# Patient Record
Sex: FEMALE | Race: BLACK OR AFRICAN AMERICAN | NOT HISPANIC OR LATINO | URBAN - METROPOLITAN AREA
[De-identification: names, ages, dates, MRNs, and addresses within clinical notes are randomized per-mention and may not be internally consistent; named-entity substitution may affect disease eponyms.]

---

## 2019-05-01 ENCOUNTER — WALK IN (OUTPATIENT)
Dept: URGENT CARE | Age: 34
End: 2019-05-01

## 2019-05-01 VITALS
HEART RATE: 76 BPM | SYSTOLIC BLOOD PRESSURE: 120 MMHG | RESPIRATION RATE: 20 BRPM | DIASTOLIC BLOOD PRESSURE: 84 MMHG | HEIGHT: 67 IN | TEMPERATURE: 98.2 F | WEIGHT: 230 LBS | BODY MASS INDEX: 36.1 KG/M2

## 2019-05-01 DIAGNOSIS — J30.9 ALLERGIC RHINITIS, UNSPECIFIED SEASONALITY, UNSPECIFIED TRIGGER: ICD-10-CM

## 2019-05-01 DIAGNOSIS — J00 ACUTE NASOPHARYNGITIS: Primary | ICD-10-CM

## 2019-05-01 DIAGNOSIS — L21.9 SEBORRHEIC DERMATITIS: ICD-10-CM

## 2019-05-01 LAB
INTERNAL PROCEDURAL CONTROLS ACCEPTABLE: YES
S PYO AG THROAT QL IA.RAPID: NEGATIVE

## 2019-05-01 PROCEDURE — 99203 OFFICE O/P NEW LOW 30 MIN: CPT | Performed by: NURSE PRACTITIONER

## 2019-05-01 PROCEDURE — 87880 STREP A ASSAY W/OPTIC: CPT | Performed by: NURSE PRACTITIONER

## 2019-05-01 SDOH — HEALTH STABILITY: PHYSICAL HEALTH: ON AVERAGE, HOW MANY DAYS PER WEEK DO YOU ENGAGE IN MODERATE TO STRENUOUS EXERCISE (LIKE A BRISK WALK)?: 0 DAYS

## 2019-05-01 ASSESSMENT — ENCOUNTER SYMPTOMS
RESPIRATORY NEGATIVE: 1
CONSTITUTIONAL NEGATIVE: 1
ALLERGIC/IMMUNOLOGIC COMMENTS: SEASONAL ALLERGIES
GASTROINTESTINAL NEGATIVE: 1
NEUROLOGICAL NEGATIVE: 1
EYES NEGATIVE: 1

## 2019-05-24 NOTE — Clinical Note
Pt presents with abnormal platelet count.  Pt notes she has been more tired than usual.     Thank you for allowing me to care for your patient! She is doing well after surgery. I will also follow her for high risk and will take care of ordering her breast imaging. Thanks, Renetta Rangel

## 2019-05-28 ENCOUNTER — HOSPITAL ENCOUNTER (OUTPATIENT)
Age: 34
Discharge: HOME | End: 2019-05-28
Attending: EMERGENCY MEDICINE
Payer: COMMERCIAL

## 2019-05-28 ENCOUNTER — APPOINTMENT (OUTPATIENT)
Dept: CT IMAGING | Age: 34
End: 2019-05-28
Attending: EMERGENCY MEDICINE
Payer: COMMERCIAL

## 2019-05-28 VITALS
DIASTOLIC BLOOD PRESSURE: 69 MMHG | SYSTOLIC BLOOD PRESSURE: 121 MMHG | WEIGHT: 230 LBS | HEART RATE: 88 BPM | TEMPERATURE: 99 F | RESPIRATION RATE: 18 BRPM | HEIGHT: 67 IN | OXYGEN SATURATION: 98 % | BODY MASS INDEX: 36.1 KG/M2

## 2019-05-28 DIAGNOSIS — R10.9 ABDOMINAL PAIN OF UNKNOWN ETIOLOGY: Primary | ICD-10-CM

## 2019-05-28 PROCEDURE — 99204 OFFICE O/P NEW MOD 45 MIN: CPT

## 2019-05-28 PROCEDURE — 74176 CT ABD & PELVIS W/O CONTRAST: CPT | Performed by: EMERGENCY MEDICINE

## 2019-05-28 PROCEDURE — 81025 URINE PREGNANCY TEST: CPT | Performed by: EMERGENCY MEDICINE

## 2019-05-28 PROCEDURE — 81002 URINALYSIS NONAUTO W/O SCOPE: CPT | Performed by: EMERGENCY MEDICINE

## 2019-05-28 PROCEDURE — 36415 COLL VENOUS BLD VENIPUNCTURE: CPT

## 2019-05-28 PROCEDURE — 85025 COMPLETE CBC W/AUTO DIFF WBC: CPT | Performed by: EMERGENCY MEDICINE

## 2019-05-29 NOTE — ED PROVIDER NOTES
Patient Seen in: [facility]    History   Patient presents with:  Abdomen/Flank Pain (GI/)    Stated Complaint: right side pain burning to back     HPI    68-year-old [place] American female who presents to the immediate care t she is afebrile    Head is normocephalic atraumatic conjunctiva is clear. Sclerae anicteric. Neck is supple. Lungs are clear to auscultation bilaterally.   Heart is regular rate and rhythm without murmur gallop or rub    Abdomen is soft nondistended no quadrant abdominal pain for over a month. Saw her OB/GYN today and was told to get an evaluation for possible appendicitis. Patient's work appears unremarkable. Her CT is negative urinalysis is negative pregnancy test is negative.   CBC is normal.  I thi

## 2019-05-29 NOTE — ED INITIAL ASSESSMENT (HPI)
Pt here c/o rt lower pelvic pain. States pain has been present for last month but getting worse. Has had slight nausea, diarrhea over last couple weeks. Denies vomiting.    Pt saw GYN doctor today for same complaint and was told it might be her appendi

## 2019-10-21 ENCOUNTER — WALK IN (OUTPATIENT)
Dept: URGENT CARE | Age: 34
End: 2019-10-21

## 2019-10-21 VITALS
WEIGHT: 230 LBS | TEMPERATURE: 98.2 F | BODY MASS INDEX: 36.1 KG/M2 | HEART RATE: 78 BPM | DIASTOLIC BLOOD PRESSURE: 76 MMHG | HEIGHT: 67 IN | SYSTOLIC BLOOD PRESSURE: 128 MMHG | RESPIRATION RATE: 15 BRPM

## 2019-10-21 DIAGNOSIS — J02.9 SORE THROAT: Primary | ICD-10-CM

## 2019-10-21 LAB
INTERNAL PROCEDURAL CONTROLS ACCEPTABLE: YES
S PYO AG THROAT QL IA.RAPID: NEGATIVE

## 2019-10-21 PROCEDURE — 87880 STREP A ASSAY W/OPTIC: CPT | Performed by: NURSE PRACTITIONER

## 2019-10-21 PROCEDURE — 87081 CULTURE SCREEN ONLY: CPT | Performed by: NURSE PRACTITIONER

## 2019-10-21 PROCEDURE — 99213 OFFICE O/P EST LOW 20 MIN: CPT | Performed by: NURSE PRACTITIONER

## 2019-10-21 ASSESSMENT — ENCOUNTER SYMPTOMS
STRIDOR: 0
FATIGUE: 0
SHORTNESS OF BREATH: 0
HEADACHES: 0
WEAKNESS: 0
FEVER: 0
CHILLS: 0
SINUS PAIN: 0
TROUBLE SWALLOWING: 0
CHEST TIGHTNESS: 0
SINUS PRESSURE: 0
DIAPHORESIS: 0
ADENOPATHY: 0
RHINORRHEA: 1
COUGH: 0
ACTIVITY CHANGE: 0
DIZZINESS: 0
WHEEZING: 0

## 2019-10-23 LAB
REPORT STATUS (RPT): NORMAL
S PYO SPEC QL CULT: NORMAL
SPECIMEN SOURCE: NORMAL

## 2019-10-24 ENCOUNTER — TELEPHONE (OUTPATIENT)
Dept: URGENT CARE | Age: 34
End: 2019-10-24

## 2019-10-28 ENCOUNTER — WALK IN (OUTPATIENT)
Dept: URGENT CARE | Age: 34
End: 2019-10-28

## 2019-10-28 VITALS
RESPIRATION RATE: 16 BRPM | OXYGEN SATURATION: 96 % | SYSTOLIC BLOOD PRESSURE: 120 MMHG | TEMPERATURE: 98.5 F | HEART RATE: 89 BPM | DIASTOLIC BLOOD PRESSURE: 80 MMHG

## 2019-10-28 DIAGNOSIS — J02.0 STREP PHARYNGITIS: ICD-10-CM

## 2019-10-28 DIAGNOSIS — J06.9 VIRAL UPPER RESPIRATORY ILLNESS: Primary | ICD-10-CM

## 2019-10-28 LAB
INTERNAL PROCEDURAL CONTROLS ACCEPTABLE: YES
S PYO AG THROAT QL IA.RAPID: POSITIVE

## 2019-10-28 PROCEDURE — 99213 OFFICE O/P EST LOW 20 MIN: CPT | Performed by: NURSE PRACTITIONER

## 2019-10-28 PROCEDURE — 87880 STREP A ASSAY W/OPTIC: CPT | Performed by: NURSE PRACTITIONER

## 2019-10-28 RX ORDER — FLUTICASONE PROPIONATE 50 MCG
2 SPRAY, SUSPENSION (ML) NASAL DAILY
Qty: 16 G | Refills: 12 | Status: SHIPPED | OUTPATIENT
Start: 2019-10-28 | End: 2021-05-19 | Stop reason: ALTCHOICE

## 2019-10-28 RX ORDER — AMOXICILLIN 500 MG/1
500 CAPSULE ORAL 2 TIMES DAILY
Qty: 20 CAPSULE | Refills: 0 | Status: SHIPPED | OUTPATIENT
Start: 2019-10-28 | End: 2019-11-07

## 2019-10-28 ASSESSMENT — ENCOUNTER SYMPTOMS
SHORTNESS OF BREATH: 0
CHEST TIGHTNESS: 0
WHEEZING: 0
FATIGUE: 0
RESPIRATORY NEGATIVE: 1
ABDOMINAL PAIN: 0
DIZZINESS: 0
VOMITING: 0
SORE THROAT: 1
NAUSEA: 0
HEADACHES: 0
GASTROINTESTINAL NEGATIVE: 1
ALLERGIC/IMMUNOLOGIC NEGATIVE: 1
PSYCHIATRIC NEGATIVE: 1
APPETITE CHANGE: 0
TROUBLE SWALLOWING: 0
ENDOCRINE NEGATIVE: 1
COUGH: 0
EYES NEGATIVE: 1
SWOLLEN GLANDS: 0
HEMATOLOGIC/LYMPHATIC NEGATIVE: 1
SINUS PRESSURE: 0
CONSTITUTIONAL NEGATIVE: 1
SINUS PAIN: 0
DIARRHEA: 0
LIGHT-HEADEDNESS: 0
HOARSE VOICE: 0
NEUROLOGICAL NEGATIVE: 1
FEVER: 0
RHINORRHEA: 1
CHILLS: 0

## 2020-07-23 ENCOUNTER — OFFICE VISIT (OUTPATIENT)
Dept: FAMILY MEDICINE CLINIC | Facility: CLINIC | Age: 35
End: 2020-07-23
Payer: COMMERCIAL

## 2020-07-23 ENCOUNTER — LAB ENCOUNTER (OUTPATIENT)
Dept: LAB | Age: 35
End: 2020-07-23
Attending: FAMILY MEDICINE
Payer: COMMERCIAL

## 2020-07-23 VITALS
HEIGHT: 66 IN | RESPIRATION RATE: 16 BRPM | HEART RATE: 88 BPM | TEMPERATURE: 98 F | WEIGHT: 243 LBS | BODY MASS INDEX: 39.05 KG/M2 | OXYGEN SATURATION: 99 % | SYSTOLIC BLOOD PRESSURE: 118 MMHG | DIASTOLIC BLOOD PRESSURE: 76 MMHG

## 2020-07-23 DIAGNOSIS — E66.09 CLASS 2 OBESITY DUE TO EXCESS CALORIES WITHOUT SERIOUS COMORBIDITY WITH BODY MASS INDEX (BMI) OF 39.0 TO 39.9 IN ADULT: ICD-10-CM

## 2020-07-23 DIAGNOSIS — Z12.4 CERVICAL CANCER SCREENING: ICD-10-CM

## 2020-07-23 DIAGNOSIS — Z13.31 DEPRESSION SCREENING: ICD-10-CM

## 2020-07-23 DIAGNOSIS — Z80.3 FAMILY HISTORY OF BREAST CANCER IN FEMALE: ICD-10-CM

## 2020-07-23 DIAGNOSIS — Z01.419 WELL WOMAN EXAM WITH ROUTINE GYNECOLOGICAL EXAM: ICD-10-CM

## 2020-07-23 DIAGNOSIS — Z76.89 ENCOUNTER TO ESTABLISH CARE: ICD-10-CM

## 2020-07-23 DIAGNOSIS — Z71.82 EXERCISE COUNSELING: ICD-10-CM

## 2020-07-23 DIAGNOSIS — Z71.3 WEIGHT LOSS COUNSELING, ENCOUNTER FOR: ICD-10-CM

## 2020-07-23 DIAGNOSIS — Z00.00 LABORATORY TESTS ORDERED AS PART OF A COMPLETE PHYSICAL EXAM (CPE): ICD-10-CM

## 2020-07-23 DIAGNOSIS — Z12.4 PAP SMEAR FOR CERVICAL CANCER SCREENING: ICD-10-CM

## 2020-07-23 DIAGNOSIS — Z01.419 WELL WOMAN EXAM WITH ROUTINE GYNECOLOGICAL EXAM: Primary | ICD-10-CM

## 2020-07-23 DIAGNOSIS — N92.6 MISSED MENSES: ICD-10-CM

## 2020-07-23 PROBLEM — N80.9 ENDOMETRIOSIS: Status: ACTIVE | Noted: 2020-07-23

## 2020-07-23 LAB
ALBUMIN SERPL-MCNC: 3.6 G/DL (ref 3.4–5)
ALBUMIN/GLOB SERPL: 1.1 {RATIO} (ref 1–2)
ALP LIVER SERPL-CCNC: 27 U/L (ref 37–98)
ALT SERPL-CCNC: 27 U/L (ref 13–56)
ANION GAP SERPL CALC-SCNC: 3 MMOL/L (ref 0–18)
AST SERPL-CCNC: 15 U/L (ref 15–37)
BASOPHILS # BLD AUTO: 0.02 X10(3) UL (ref 0–0.2)
BASOPHILS NFR BLD AUTO: 0.4 %
BILIRUB SERPL-MCNC: 0.5 MG/DL (ref 0.1–2)
BUN BLD-MCNC: 9 MG/DL (ref 7–18)
BUN/CREAT SERPL: 12.9 (ref 10–20)
CALCIUM BLD-MCNC: 8.8 MG/DL (ref 8.5–10.1)
CHLORIDE SERPL-SCNC: 108 MMOL/L (ref 98–112)
CHOLEST SMN-MCNC: 90 MG/DL (ref ?–200)
CO2 SERPL-SCNC: 26 MMOL/L (ref 21–32)
CREAT BLD-MCNC: 0.7 MG/DL (ref 0.55–1.02)
DEPRECATED RDW RBC AUTO: 43.7 FL (ref 35.1–46.3)
EOSINOPHIL # BLD AUTO: 0.09 X10(3) UL (ref 0–0.7)
EOSINOPHIL NFR BLD AUTO: 1.9 %
ERYTHROCYTE [DISTWIDTH] IN BLOOD BY AUTOMATED COUNT: 13.3 % (ref 11–15)
EST. AVERAGE GLUCOSE BLD GHB EST-MCNC: 120 MG/DL (ref 68–126)
GLOBULIN PLAS-MCNC: 3.4 G/DL (ref 2.8–4.4)
GLUCOSE BLD-MCNC: 97 MG/DL (ref 70–99)
HBA1C MFR BLD HPLC: 5.8 % (ref ?–5.7)
HCT VFR BLD AUTO: 36.5 % (ref 35–48)
HDLC SERPL-MCNC: 34 MG/DL (ref 40–59)
HGB BLD-MCNC: 11.8 G/DL (ref 12–16)
IMM GRANULOCYTES # BLD AUTO: 0.01 X10(3) UL (ref 0–1)
IMM GRANULOCYTES NFR BLD: 0.2 %
LDLC SERPL CALC-MCNC: 37 MG/DL (ref ?–100)
LYMPHOCYTES # BLD AUTO: 1.76 X10(3) UL (ref 1–4)
LYMPHOCYTES NFR BLD AUTO: 36.4 %
M PROTEIN MFR SERPL ELPH: 7 G/DL (ref 6.4–8.2)
MCH RBC QN AUTO: 29 PG (ref 26–34)
MCHC RBC AUTO-ENTMCNC: 32.3 G/DL (ref 31–37)
MCV RBC AUTO: 89.7 FL (ref 80–100)
MONOCYTES # BLD AUTO: 0.38 X10(3) UL (ref 0.1–1)
MONOCYTES NFR BLD AUTO: 7.9 %
NEUTROPHILS # BLD AUTO: 2.57 X10 (3) UL (ref 1.5–7.7)
NEUTROPHILS # BLD AUTO: 2.57 X10(3) UL (ref 1.5–7.7)
NEUTROPHILS NFR BLD AUTO: 53.2 %
NONHDLC SERPL-MCNC: 56 MG/DL (ref ?–130)
OSMOLALITY SERPL CALC.SUM OF ELEC: 283 MOSM/KG (ref 275–295)
PATIENT FASTING Y/N/NP: NO
PATIENT FASTING Y/N/NP: NO
PLATELET # BLD AUTO: 175 10(3)UL (ref 150–450)
POCT URINE PREGNANCY: NEGATIVE
POTASSIUM SERPL-SCNC: 3.8 MMOL/L (ref 3.5–5.1)
PROCEDURE CONTROL: YES
RBC # BLD AUTO: 4.07 X10(6)UL (ref 3.8–5.3)
SODIUM SERPL-SCNC: 137 MMOL/L (ref 136–145)
T4 FREE SERPL-MCNC: 1.1 NG/DL (ref 0.8–1.7)
TRIGL SERPL-MCNC: 97 MG/DL (ref 30–149)
TSI SER-ACNC: 1.25 MIU/ML (ref 0.36–3.74)
VLDLC SERPL CALC-MCNC: 19 MG/DL (ref 0–30)
WBC # BLD AUTO: 4.8 X10(3) UL (ref 4–11)

## 2020-07-23 PROCEDURE — 3078F DIAST BP <80 MM HG: CPT | Performed by: FAMILY MEDICINE

## 2020-07-23 PROCEDURE — 88175 CYTOPATH C/V AUTO FLUID REDO: CPT | Performed by: FAMILY MEDICINE

## 2020-07-23 PROCEDURE — 84439 ASSAY OF FREE THYROXINE: CPT

## 2020-07-23 PROCEDURE — 99385 PREV VISIT NEW AGE 18-39: CPT | Performed by: FAMILY MEDICINE

## 2020-07-23 PROCEDURE — 87624 HPV HI-RISK TYP POOLED RSLT: CPT | Performed by: FAMILY MEDICINE

## 2020-07-23 PROCEDURE — 80061 LIPID PANEL: CPT

## 2020-07-23 PROCEDURE — 80053 COMPREHEN METABOLIC PANEL: CPT

## 2020-07-23 PROCEDURE — 85025 COMPLETE CBC W/AUTO DIFF WBC: CPT

## 2020-07-23 PROCEDURE — 84443 ASSAY THYROID STIM HORMONE: CPT

## 2020-07-23 PROCEDURE — 87591 N.GONORRHOEAE DNA AMP PROB: CPT | Performed by: FAMILY MEDICINE

## 2020-07-23 PROCEDURE — 83036 HEMOGLOBIN GLYCOSYLATED A1C: CPT

## 2020-07-23 PROCEDURE — 3074F SYST BP LT 130 MM HG: CPT | Performed by: FAMILY MEDICINE

## 2020-07-23 PROCEDURE — 3008F BODY MASS INDEX DOCD: CPT | Performed by: FAMILY MEDICINE

## 2020-07-23 PROCEDURE — 87491 CHLMYD TRACH DNA AMP PROBE: CPT | Performed by: FAMILY MEDICINE

## 2020-07-23 PROCEDURE — 36415 COLL VENOUS BLD VENIPUNCTURE: CPT

## 2020-07-23 NOTE — PROGRESS NOTES
HPI:   Lexa Mccullough is a 38year old female that presents for well woman exam.   Patient presents with:  Physical: Routine annual well womens exam. She is a new patient with complaints of lumps in her cervix. Labs due and pap today. - she has concerns th Estimated body mass index is 39.22 kg/m² as calculated from the following:    Height as of this encounter: 66\". Weight as of this encounter: 243 lb (110.2 kg). Vital signs reviewed. Appears stated age, well groomed.   Physical Exam:  GEN:  Patient is a PAP SMEAR B  - THINPREP PAP SMEAR ONLY    3. Laboratory tests ordered as part of a complete physical exam (CPE)  - CBC WITH DIFFERENTIAL WITH PLATELET; Future  - COMP METABOLIC PANEL (14); Future  - LIPID PANEL; Future    4.  Depression screening  PHQ-2 Dep adult  - TSH+FREE T4; Future  - HEMOGLOBIN A1C; Future    11. Missed menses  - POCT PREGNANCY, URINE  Negative test.     Risks, benefits, and alternatives of current treatment plan discussed in detail. Red flags discussed to RTC or ED .  Questions and joce

## 2020-07-23 NOTE — PATIENT INSTRUCTIONS
Prevention Guidelines, Women Ages 25 to 44  Screening tests and vaccines are an important part of managing your health. A screening test is done to find possible disorders or diseases in people who don't have any symptoms.  The goal is to find a disease e Type 2 diabetes, prediabetes All women diagnosed with gestational diabetes Lifelong testing every 3 years   Type 2 diabetes All women with prediabetes Every year   Gonorrhea Sexually active women at increased risk for infection At routine exams   Hepatitis Measles, mumps, rubella (MMR) All women in this age group who have no record of these infections or vaccines 1 or 2 doses   Meningococcal Women at increased risk for infection should talk with their healthcare provider 1 or more doses   Pneumococcal conjug © 9572-1100 [employer] [address] [facility], [facility] [place]. All rights reserved. This information is not intended as a substitute for professional medical care. Always follow your healthcare professional's instructions. seated/independent

## 2020-07-24 LAB
C TRACH DNA SPEC QL NAA+PROBE: NEGATIVE
HPV I/H RISK 1 DNA SPEC QL NAA+PROBE: NEGATIVE
N GONORRHOEA DNA SPEC QL NAA+PROBE: NEGATIVE

## 2020-09-21 ENCOUNTER — OFFICE VISIT (OUTPATIENT)
Dept: FAMILY MEDICINE CLINIC | Facility: CLINIC | Age: 35
End: 2020-09-21
Payer: COMMERCIAL

## 2020-09-21 VITALS
DIASTOLIC BLOOD PRESSURE: 74 MMHG | HEART RATE: 82 BPM | TEMPERATURE: 98 F | WEIGHT: 237 LBS | RESPIRATION RATE: 16 BRPM | OXYGEN SATURATION: 98 % | HEIGHT: 66 IN | SYSTOLIC BLOOD PRESSURE: 118 MMHG | BODY MASS INDEX: 38.09 KG/M2

## 2020-09-21 DIAGNOSIS — Z20.822 ENCOUNTER FOR LABORATORY TESTING FOR COVID-19 VIRUS: ICD-10-CM

## 2020-09-21 DIAGNOSIS — J02.9 ACUTE PHARYNGITIS, UNSPECIFIED ETIOLOGY: Primary | ICD-10-CM

## 2020-09-21 DIAGNOSIS — R52 BODY ACHES: ICD-10-CM

## 2020-09-21 PROCEDURE — 99213 OFFICE O/P EST LOW 20 MIN: CPT | Performed by: FAMILY MEDICINE

## 2020-09-21 PROCEDURE — 3008F BODY MASS INDEX DOCD: CPT | Performed by: FAMILY MEDICINE

## 2020-09-21 PROCEDURE — 3078F DIAST BP <80 MM HG: CPT | Performed by: FAMILY MEDICINE

## 2020-09-21 PROCEDURE — 3074F SYST BP LT 130 MM HG: CPT | Performed by: FAMILY MEDICINE

## 2020-09-21 RX ORDER — AZITHROMYCIN 250 MG/1
TABLET, FILM COATED ORAL
Qty: 6 TABLET | Refills: 0 | Status: SHIPPED | OUTPATIENT
Start: 2020-09-21 | End: 2020-09-26

## 2020-09-21 NOTE — PROGRESS NOTES
VIRTUAL/TELEPHONE ENCOUNTER    Subjective    This visit is conducted using live telephone encounter with patient due to [facility] emergency. Chief Complaint:  Patient presents with:  Cough: itchy skin and her neighbor tested positive for strep.  she general with neck movement no  Pain with opening mouth no  Dysphagia no    Therapies tired:  Acetaminophen    COVID-19 QUESTIONS - quick screening.    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 TE done instead of ov due to COVID-19 emergency.      Duration of the service: 12 minutes were spent with the patient     Summary of topics discussed:  URI, COVID risks/testing indica

## 2020-09-21 NOTE — PATIENT INSTRUCTIONS
Coronavirus Disease 2019 (COVID-19): Overview  Coronavirus disease 2019 (COVID-19) is a respiratory illness. It's caused by a new (novel) coronavirus. There are many types of coronavirus. Coronaviruses are a very common cause of colds and bronchitis.  The You can check your symptoms with the CDC’s Coronavirus Self-. What are possible complications from [id number]? In many cases, this virus can cause infection (pneumonia) in both lungs. In some cases, this can cause death.  Certain people are at highe Your healthcare provider will ask about your symptoms. He or she will ask where you live, and about your recent travel, and any contact with sick people.  If your healthcare provider thinks you may have COVID-19, he or she will consider whether to test you The most proven treatments right now are those to help your body while it fights the virus. This is known as supportive care. Supportive care may include:   · Getting rest.  This helps your body fight the illness. · Staying hydrated.   Drinking liquids is People who have had COVID-19 and are fully recovered may be asked by their healthcare team to consider donating plasma. This is called COVID-19 convalescent plasma donation.  Plasma from people fully recovered from COVID-19 may contain antibodies to help fi

## 2020-09-22 ENCOUNTER — APPOINTMENT (OUTPATIENT)
Dept: LAB | Age: 35
End: 2020-09-22
Attending: FAMILY MEDICINE
Payer: COMMERCIAL

## 2020-09-22 DIAGNOSIS — R52 BODY ACHES: ICD-10-CM

## 2020-09-22 DIAGNOSIS — Z20.822 ENCOUNTER FOR LABORATORY TESTING FOR COVID-19 VIRUS: ICD-10-CM

## 2020-09-22 DIAGNOSIS — J02.9 ACUTE PHARYNGITIS, UNSPECIFIED ETIOLOGY: ICD-10-CM

## 2020-09-24 LAB — SARS-COV-2 RNA RESP QL NAA+PROBE: NOT DETECTED

## 2020-09-28 ENCOUNTER — E-VISIT (OUTPATIENT)
Dept: TELEHEALTH | Age: 35
End: 2020-09-28

## 2020-09-28 DIAGNOSIS — J40 BRONCHITIS: Primary | ICD-10-CM

## 2020-09-28 PROCEDURE — 99422 OL DIG E/M SVC 11-20 MIN: CPT | Performed by: NURSE PRACTITIONER

## 2020-09-28 RX ORDER — BENZONATATE 200 MG/1
200 CAPSULE ORAL 3 TIMES DAILY PRN
Qty: 30 CAPSULE | Refills: 0 | Status: SHIPPED | OUTPATIENT
Start: 2020-09-28 | End: 2020-12-03 | Stop reason: ALTCHOICE

## 2020-09-28 RX ORDER — PREDNISONE 20 MG/1
TABLET ORAL
Qty: 12 TABLET | Refills: 0 | Status: SHIPPED | OUTPATIENT
Start: 2020-09-28 | End: 2020-12-03 | Stop reason: ALTCHOICE

## 2020-09-28 RX ORDER — ALBUTEROL SULFATE 90 UG/1
2 AEROSOL, METERED RESPIRATORY (INHALATION) EVERY 6 HOURS PRN
Qty: 1 INHALER | Refills: 0 | Status: SHIPPED | OUTPATIENT
Start: 2020-09-28 | End: 2021-09-23

## 2020-09-28 NOTE — PROGRESS NOTES
Adam Chang is a 38year old female. HPI:   See answers to questions above.      Current Outpatient Medications   Medication Sig Dispense Refill   • Albuterol Sulfate  (90 Base) MCG/ACT Inhalation Aero Soln Inhale 2 puffs into the lungs every Patient advised to follow up with PCP if no improvement or worsening of symptoms  Refer to MyChart message for specific patient instructions        Duration of  the service:  12 minutes

## 2021-02-19 ENCOUNTER — PATIENT MESSAGE (OUTPATIENT)
Dept: FAMILY MEDICINE CLINIC | Facility: CLINIC | Age: 36
End: 2021-02-19

## 2021-02-19 DIAGNOSIS — Z12.39 ENCOUNTER FOR SCREENING BREAST EXAMINATION: ICD-10-CM

## 2021-02-19 DIAGNOSIS — Z12.31 BREAST CANCER SCREENING BY MAMMOGRAM: Primary | ICD-10-CM

## 2021-02-20 NOTE — TELEPHONE ENCOUNTER
From: Faustino Coyne  To: Florina Alfred DO  Sent: 2021 10:31 PM CST  Subject: Non-Urgent Medical Question    Hello     I need to schedule my annual mammogram. You had a referral for it here, but I think it .     Thanks,    Carolin Rosa

## 2021-05-19 ENCOUNTER — HOSPITAL ENCOUNTER (OUTPATIENT)
Dept: MAMMOGRAPHY | Age: 36
Discharge: HOME | End: 2021-05-19
Attending: FAMILY MEDICINE
Payer: COMMERCIAL

## 2021-05-19 ENCOUNTER — WALK IN (OUTPATIENT)
Dept: URGENT CARE | Age: 36
End: 2021-05-19

## 2021-05-19 VITALS
HEART RATE: 76 BPM | TEMPERATURE: 97.5 F | BODY MASS INDEX: 38.45 KG/M2 | RESPIRATION RATE: 20 BRPM | HEIGHT: 67 IN | DIASTOLIC BLOOD PRESSURE: 84 MMHG | WEIGHT: 245 LBS | SYSTOLIC BLOOD PRESSURE: 116 MMHG

## 2021-05-19 DIAGNOSIS — Z12.31 BREAST CANCER SCREENING BY MAMMOGRAM: ICD-10-CM

## 2021-05-19 DIAGNOSIS — Z12.39 ENCOUNTER FOR SCREENING BREAST EXAMINATION: ICD-10-CM

## 2021-05-19 DIAGNOSIS — J30.9 ALLERGIC RHINITIS, UNSPECIFIED SEASONALITY, UNSPECIFIED TRIGGER: ICD-10-CM

## 2021-05-19 DIAGNOSIS — J06.9 VIRAL UPPER RESPIRATORY TRACT INFECTION: Primary | ICD-10-CM

## 2021-05-19 PROBLEM — N80.9 ENDOMETRIOSIS: Status: ACTIVE | Noted: 2020-07-23

## 2021-05-19 LAB
INTERNAL PROCEDURAL CONTROLS ACCEPTABLE: YES
S PYO AG THROAT QL IA.RAPID: NEGATIVE
SARS-COV+SARS-COV-2 AG RESP QL IA.RAPID: NOT DETECTED

## 2021-05-19 PROCEDURE — 77067 SCR MAMMO BI INCL CAD: CPT | Performed by: FAMILY MEDICINE

## 2021-05-19 PROCEDURE — 99213 OFFICE O/P EST LOW 20 MIN: CPT | Performed by: NURSE PRACTITIONER

## 2021-05-19 PROCEDURE — 87426 SARSCOV CORONAVIRUS AG IA: CPT | Performed by: NURSE PRACTITIONER

## 2021-05-19 PROCEDURE — 77063 BREAST TOMOSYNTHESIS BI: CPT | Performed by: FAMILY MEDICINE

## 2021-05-19 PROCEDURE — 87880 STREP A ASSAY W/OPTIC: CPT | Performed by: NURSE PRACTITIONER

## 2021-05-19 RX ORDER — FLUTICASONE PROPIONATE 50 MCG
2 SPRAY, SUSPENSION (ML) NASAL DAILY
Qty: 16 G | Refills: 0 | Status: SHIPPED | OUTPATIENT
Start: 2021-05-19

## 2021-05-19 ASSESSMENT — ENCOUNTER SYMPTOMS
HEADACHES: 0
EYES NEGATIVE: 1
FATIGUE: 0
COUGH: 0
RHINORRHEA: 1
WHEEZING: 0
DIZZINESS: 0
ACTIVITY CHANGE: 0
LIGHT-HEADEDNESS: 0
FEVER: 0
GASTROINTESTINAL NEGATIVE: 1
SORE THROAT: 1
SHORTNESS OF BREATH: 0

## 2021-05-21 ENCOUNTER — OFFICE VISIT (OUTPATIENT)
Dept: FAMILY MEDICINE CLINIC | Facility: CLINIC | Age: 36
End: 2021-05-21
Payer: COMMERCIAL

## 2021-05-21 VITALS
OXYGEN SATURATION: 99 % | RESPIRATION RATE: 16 BRPM | HEIGHT: 66 IN | DIASTOLIC BLOOD PRESSURE: 74 MMHG | WEIGHT: 245 LBS | TEMPERATURE: 98 F | HEART RATE: 88 BPM | BODY MASS INDEX: 39.37 KG/M2 | SYSTOLIC BLOOD PRESSURE: 116 MMHG

## 2021-05-21 DIAGNOSIS — E04.9 ENLARGED THYROID: Primary | ICD-10-CM

## 2021-05-21 DIAGNOSIS — E66.09 CLASS 2 OBESITY DUE TO EXCESS CALORIES WITHOUT SERIOUS COMORBIDITY WITH BODY MASS INDEX (BMI) OF 39.0 TO 39.9 IN ADULT: ICD-10-CM

## 2021-05-21 PROCEDURE — 3078F DIAST BP <80 MM HG: CPT | Performed by: FAMILY MEDICINE

## 2021-05-21 PROCEDURE — 3008F BODY MASS INDEX DOCD: CPT | Performed by: FAMILY MEDICINE

## 2021-05-21 PROCEDURE — 99213 OFFICE O/P EST LOW 20 MIN: CPT | Performed by: FAMILY MEDICINE

## 2021-05-21 PROCEDURE — 3074F SYST BP LT 130 MM HG: CPT | Performed by: FAMILY MEDICINE

## 2021-05-21 NOTE — PROGRESS NOTES
Patient presents with:  Thyroid Problem: She went to [facility] and was told her thyroid felt enlarged. she has concerns as to thats why she has been gaining weight      HPI:  75-year-old female patient who is presenting with enlarged thyroid gland.   She was seen Inhale 2 puffs into the lungs every 6 (six) hours as needed for Wheezing. 1 Inhaler 0       Allergies    Shellfish Allergy       NAUSEA AND VOMITING    Health Maintenance  Immunizations: There is no immunization history on file for this patient.       Ph encounter       Imaging & Consults:  OP REFERRAL TO WEIGHT LOSS CLINIC  US THYROID ([id number])      No follow-ups on file. There are no Patient Instructions on file for this visit. All questions were answered and the patient understands the plan.

## 2021-05-25 ENCOUNTER — HOSPITAL ENCOUNTER (OUTPATIENT)
Dept: ULTRASOUND IMAGING | Age: 36
Discharge: HOME | End: 2021-05-25
Attending: FAMILY MEDICINE
Payer: COMMERCIAL

## 2021-05-25 ENCOUNTER — LAB ENCOUNTER (OUTPATIENT)
Dept: LAB | Age: 36
End: 2021-05-25
Attending: FAMILY MEDICINE
Payer: COMMERCIAL

## 2021-05-25 DIAGNOSIS — E04.9 ENLARGED THYROID: ICD-10-CM

## 2021-05-25 PROCEDURE — 84443 ASSAY THYROID STIM HORMONE: CPT

## 2021-05-25 PROCEDURE — 36415 COLL VENOUS BLD VENIPUNCTURE: CPT

## 2021-05-25 PROCEDURE — 76536 US EXAM OF HEAD AND NECK: CPT | Performed by: FAMILY MEDICINE

## 2021-05-25 PROCEDURE — 86800 THYROGLOBULIN ANTIBODY: CPT

## 2021-05-25 PROCEDURE — 86376 MICROSOMAL ANTIBODY EACH: CPT

## 2021-05-25 PROCEDURE — 84439 ASSAY OF FREE THYROXINE: CPT

## 2021-05-27 ENCOUNTER — PATIENT MESSAGE (OUTPATIENT)
Dept: FAMILY MEDICINE CLINIC | Facility: CLINIC | Age: 36
End: 2021-05-27

## 2021-05-27 PROBLEM — E04.1 BENIGN THYROID CYST: Status: ACTIVE | Noted: 2021-05-27

## 2021-05-27 NOTE — TELEPHONE ENCOUNTER
From: Gail Sen  To: Giselle Elliott DO  Sent: 5/27/2021 7:58 AM CDT  Subject: Test Results Question    Hello,    I saw my test results came in yesterday. Just wondering what the results mean and next steps.      Thanks,    Gabriela Araujo

## 2021-06-07 ENCOUNTER — WALK IN (OUTPATIENT)
Dept: URGENT CARE | Age: 36
End: 2021-06-07

## 2021-06-07 VITALS
WEIGHT: 245 LBS | SYSTOLIC BLOOD PRESSURE: 120 MMHG | BODY MASS INDEX: 38.45 KG/M2 | OXYGEN SATURATION: 100 % | DIASTOLIC BLOOD PRESSURE: 82 MMHG | HEART RATE: 80 BPM | HEIGHT: 67 IN | TEMPERATURE: 100.1 F | RESPIRATION RATE: 20 BRPM

## 2021-06-07 DIAGNOSIS — B34.9 VIRAL SYNDROME: Primary | ICD-10-CM

## 2021-06-07 LAB
FLUAV AG UPPER RESP QL IA.RAPID: NEGATIVE
FLUBV AG UPPER RESP QL IA.RAPID: NEGATIVE
INTERNAL PROCEDURAL CONTROLS ACCEPTABLE: YES
S PYO AG THROAT QL IA.RAPID: NEGATIVE
SARS-COV+SARS-COV-2 AG RESP QL IA.RAPID: NOT DETECTED

## 2021-06-07 PROCEDURE — 87804 INFLUENZA ASSAY W/OPTIC: CPT | Performed by: NURSE PRACTITIONER

## 2021-06-07 PROCEDURE — 99213 OFFICE O/P EST LOW 20 MIN: CPT | Performed by: NURSE PRACTITIONER

## 2021-06-07 PROCEDURE — U0003 INFECTIOUS AGENT DETECTION BY NUCLEIC ACID (DNA OR RNA); SEVERE ACUTE RESPIRATORY SYNDROME CORONAVIRUS 2 (SARS-COV-2) (CORONAVIRUS DISEASE [COVID-19]), AMPLIFIED PROBE TECHNIQUE, MAKING USE OF HIGH THROUGHPUT TECHNOLOGIES AS DESCRIBED BY CMS-2020-01-R: HCPCS | Performed by: NURSE PRACTITIONER

## 2021-06-07 PROCEDURE — U0005 INFEC AGEN DETEC AMPLI PROBE: HCPCS | Performed by: NURSE PRACTITIONER

## 2021-06-07 PROCEDURE — 87426 SARSCOV CORONAVIRUS AG IA: CPT | Performed by: NURSE PRACTITIONER

## 2021-06-07 PROCEDURE — 87880 STREP A ASSAY W/OPTIC: CPT | Performed by: NURSE PRACTITIONER

## 2021-06-07 RX ORDER — BENZONATATE 200 MG/1
200 CAPSULE ORAL 3 TIMES DAILY PRN
Qty: 20 CAPSULE | Refills: 0 | Status: SHIPPED | OUTPATIENT
Start: 2021-06-07

## 2021-06-07 ASSESSMENT — ENCOUNTER SYMPTOMS
COUGH: 1
WHEEZING: 0
NEUROLOGICAL NEGATIVE: 1
ACTIVITY CHANGE: 0
CHILLS: 1
RHINORRHEA: 1
FEVER: 0
SHORTNESS OF BREATH: 0
SORE THROAT: 1
GASTROINTESTINAL NEGATIVE: 1
FATIGUE: 1

## 2021-06-08 LAB
SARS-COV-2 RNA RESP QL NAA+PROBE: NOT DETECTED
SERVICE CMNT-IMP: NORMAL
SERVICE CMNT-IMP: NORMAL

## 2021-06-30 ENCOUNTER — OFFICE VISIT (OUTPATIENT)
Dept: INTERNAL MEDICINE CLINIC | Facility: CLINIC | Age: 36
End: 2021-06-30
Payer: COMMERCIAL

## 2021-06-30 VITALS
BODY MASS INDEX: 38.3 KG/M2 | SYSTOLIC BLOOD PRESSURE: 118 MMHG | HEART RATE: 82 BPM | HEIGHT: 67 IN | WEIGHT: 244 LBS | RESPIRATION RATE: 16 BRPM | DIASTOLIC BLOOD PRESSURE: 72 MMHG

## 2021-06-30 DIAGNOSIS — E78.6 LOW HDL (UNDER 40): ICD-10-CM

## 2021-06-30 DIAGNOSIS — Z51.81 THERAPEUTIC DRUG MONITORING: Primary | ICD-10-CM

## 2021-06-30 DIAGNOSIS — R73.03 PREDIABETES: ICD-10-CM

## 2021-06-30 DIAGNOSIS — E66.9 OBESITY WITH BODY MASS INDEX (BMI) OF 35.0 TO 39.9 WITHOUT COMORBIDITY: ICD-10-CM

## 2021-06-30 DIAGNOSIS — H40.059: ICD-10-CM

## 2021-06-30 PROCEDURE — 3008F BODY MASS INDEX DOCD: CPT | Performed by: NURSE PRACTITIONER

## 2021-06-30 PROCEDURE — 93000 ELECTROCARDIOGRAM COMPLETE: CPT | Performed by: NURSE PRACTITIONER

## 2021-06-30 PROCEDURE — 99214 OFFICE O/P EST MOD 30 MIN: CPT | Performed by: NURSE PRACTITIONER

## 2021-06-30 PROCEDURE — 3074F SYST BP LT 130 MM HG: CPT | Performed by: NURSE PRACTITIONER

## 2021-06-30 PROCEDURE — 3078F DIAST BP <80 MM HG: CPT | Performed by: NURSE PRACTITIONER

## 2021-06-30 RX ORDER — PHENTERMINE HYDROCHLORIDE 15 MG/1
15 CAPSULE ORAL EVERY MORNING
Qty: 30 CAPSULE | Refills: 0 | Status: SHIPPED | OUTPATIENT
Start: 2021-06-30 | End: 2021-07-29

## 2021-06-30 NOTE — PROGRESS NOTES
HISTORY OF PRESENT ILLNESS  Patient presents with:  Weight Problem: referred by , [facility] , never had meds beside [id number] inj. open to trying meds     Clau Mcintosh is a 43year old female new to our office today f yes  Tobacco use: none  ETOH use: 2  per/week  Supplements: none  Exercise: 2 days per week- biking 5-10 miles   Stress level: 7/10  Sleep hours and integrity: 5 Hours per night    MEDICAL HISTORY  PMH reviewed:   Cardiac disorders:negative   Depression/an without murmur  GI: +BS, soft, no masses, HSM or tenderness  EXTREMITIES: no cyanosis, no clubbing, no edema  NEURO: Oriented times three, full ROM of bilateral UE/LE  PSYCH: pleasant, cooperative, normal mood and affect    Lab Results   Component Value Da Goal: 12.2  10% Goal: 24.4  Total Weight Loss: 0 lbs  Initial consult: 244  lbs on 6/2021, Down  Lb:  lbs total     PLAN   · Visualized Ekg in office today 6/30/21 shows NSR, rate 74, nl intervals, no acute ST changes, TAb292. Normal EKG.    · Will start me Aim for 3 meals/day  2. Drink lots of water and cut down on soda/juice consumption if soda/juice drinker  3. Focus on protein: (15-30 grams with each meal) ie. greek yogurt, cottage cheese, string cheese, hard boiled eggs  4.  Healthy snacks: peanut butter nut butters  -dried edamame   -fermin seeds soaked in water or almond milk  -soy nuts  -cured meats (monitor for sodium issues)   -hummus with vegetables  -bean dip with vegetables     FRUIT  Low carb fruit options   Raspberries: Half a cup (60 grams) contai

## 2021-06-30 NOTE — PATIENT INSTRUCTIONS
We are here to support you with weight loss, but please remember that you still need your primary care provider for your routine health maintenance. PLAN:  Will start phentermine 15mg   Follow up with me in 1 month  Schedule follow up appointments:  To at nutrition section-- \"nutrients\" and it will break down your macros for the day (ie. Protein, carbs, fibers, sugars and fats). Try to stay within these numbers daily     2.  \"7 minute workout\" to help with exercise/activity which takes 7 minutes of yo

## 2021-07-23 DIAGNOSIS — J30.9 ALLERGIC RHINITIS, UNSPECIFIED SEASONALITY, UNSPECIFIED TRIGGER: ICD-10-CM

## 2021-07-26 RX ORDER — FLUTICASONE PROPIONATE 50 MCG
SPRAY, SUSPENSION (ML) NASAL
Qty: 16 ML | OUTPATIENT
Start: 2021-07-26

## 2021-07-29 DIAGNOSIS — E66.9 OBESITY WITH BODY MASS INDEX (BMI) OF 35.0 TO 39.9 WITHOUT COMORBIDITY: ICD-10-CM

## 2021-07-29 DIAGNOSIS — Z51.81 THERAPEUTIC DRUG MONITORING: ICD-10-CM

## 2021-07-29 RX ORDER — PHENTERMINE HYDROCHLORIDE 15 MG/1
CAPSULE ORAL
Qty: 30 CAPSULE | Refills: 0 | Status: SHIPPED | OUTPATIENT
Start: 2021-07-29 | End: 2021-08-02

## 2021-07-29 NOTE — TELEPHONE ENCOUNTER
Requesting Phentermine 15 mg  LOV: 6/30/21  RTC: one month  Last Relevant Labs: na  Filled: 6/30/21 #30 with 0 refills last [place] 6[place]30/21 [place]    Future Appointments   Date Time Provider Fahad Bennett   8/2/2021  2:20 PM Jeannie Briscoe An

## 2021-08-02 ENCOUNTER — OFFICE VISIT (OUTPATIENT)
Dept: INTERNAL MEDICINE CLINIC | Facility: CLINIC | Age: 36
End: 2021-08-02
Payer: COMMERCIAL

## 2021-08-02 VITALS
SYSTOLIC BLOOD PRESSURE: 112 MMHG | BODY MASS INDEX: 37.67 KG/M2 | OXYGEN SATURATION: 100 % | HEART RATE: 69 BPM | WEIGHT: 240 LBS | DIASTOLIC BLOOD PRESSURE: 80 MMHG | HEIGHT: 67 IN

## 2021-08-02 DIAGNOSIS — E66.9 OBESITY WITH BODY MASS INDEX (BMI) OF 35.0 TO 39.9 WITHOUT COMORBIDITY: ICD-10-CM

## 2021-08-02 DIAGNOSIS — R73.03 PREDIABETES: ICD-10-CM

## 2021-08-02 DIAGNOSIS — Z51.81 THERAPEUTIC DRUG MONITORING: Primary | ICD-10-CM

## 2021-08-02 DIAGNOSIS — E78.6 LOW HDL (UNDER 40): ICD-10-CM

## 2021-08-02 PROCEDURE — 99214 OFFICE O/P EST MOD 30 MIN: CPT | Performed by: NURSE PRACTITIONER

## 2021-08-02 PROCEDURE — 3008F BODY MASS INDEX DOCD: CPT | Performed by: NURSE PRACTITIONER

## 2021-08-02 PROCEDURE — 3074F SYST BP LT 130 MM HG: CPT | Performed by: NURSE PRACTITIONER

## 2021-08-02 PROCEDURE — 3079F DIAST BP 80-89 MM HG: CPT | Performed by: NURSE PRACTITIONER

## 2021-08-02 RX ORDER — PHENTERMINE HYDROCHLORIDE 37.5 MG/1
37.5 TABLET ORAL
Qty: 30 TABLET | Refills: 0 | Status: SHIPPED | OUTPATIENT
Start: 2021-08-15 | End: 2021-09-13

## 2021-08-02 RX ORDER — FLUTICASONE PROPIONATE 50 MCG
SPRAY, SUSPENSION (ML) NASAL
COMMUNITY
Start: 2021-06-05

## 2021-08-02 NOTE — PATIENT INSTRUCTIONS
We are here to support you with weight loss, but please remember that you still need your primary care provider for your routine health maintenance.       PLAN:  Will increase phentermine 37.5mg  Follow up with me in 5 weeks  Schedule follow up appointments Look at nutrition section-- \"nutrients\" and it will break down your macros for the day (ie. Protein, carbs, fibers, sugars and fats). Try to stay within these numbers daily     2.  \"7 minute workout\" to help with exercise/activity which takes 7 minutes

## 2021-08-02 NOTE — PROGRESS NOTES
HISTORY OF PRESENT ILLNESS  Patient presents with:  Weight Check: down 4    Clau Barr is a 43year old female here for follow up with medical weight loss program for the treatment of overweight, obesity, or morbid obesity.      Down 4 lbs (First month pink, sclera non icteric, PERRLA  NECK: supple, no adenopathy  LUNGS: CTA in all fields, breathing non labored  CARDIO: RRR without murmur  GI: +BS, NT/ND, no masses or HSM  EXTREMITIES: no cyanosis, no clubbing, no edema  PSYCH: pleasant, cooperative, nor lbs on 6/2021  Weight Calculations  Initial Weight: 244 lbs  Initial Weight Date: 06/30/21  Today's Weight: 240 lbs  5% Goal: 12.2  10% Goal: 24.4  Total Weight Loss: 4 lbs  Total weight loss: Down 4 lbs total, Net loss 4 lbs  · Will increase medications:

## 2021-08-30 ENCOUNTER — PATIENT MESSAGE (OUTPATIENT)
Dept: FAMILY MEDICINE CLINIC | Facility: CLINIC | Age: 36
End: 2021-08-30

## 2021-08-30 DIAGNOSIS — Z20.822 CONTACT WITH AND (SUSPECTED) EXPOSURE TO COVID-19: Primary | ICD-10-CM

## 2021-08-31 NOTE — TELEPHONE ENCOUNTER
From: Sylvie Kelly  To: Chanda Temple DO  Sent: 8/30/2021 9:35 PM CDT  Subject: Non-Urgent Medical Question    Hello,    Sunday night I drove to [place] and back and attended a wedding with my mother.  She received a call on the ride home informing he

## 2021-09-01 ENCOUNTER — LAB SERVICES (OUTPATIENT)
Dept: URGENT CARE | Age: 36
End: 2021-09-01

## 2021-09-01 DIAGNOSIS — Z20.822 COVID-19 RULED OUT BY LABORATORY TESTING: Primary | ICD-10-CM

## 2021-09-01 PROCEDURE — U0005 INFEC AGEN DETEC AMPLI PROBE: HCPCS | Performed by: NURSE PRACTITIONER

## 2021-09-01 PROCEDURE — U0003 INFECTIOUS AGENT DETECTION BY NUCLEIC ACID (DNA OR RNA); SEVERE ACUTE RESPIRATORY SYNDROME CORONAVIRUS 2 (SARS-COV-2) (CORONAVIRUS DISEASE [COVID-19]), AMPLIFIED PROBE TECHNIQUE, MAKING USE OF HIGH THROUGHPUT TECHNOLOGIES AS DESCRIBED BY CMS-2020-01-R: HCPCS | Performed by: NURSE PRACTITIONER

## 2021-09-02 LAB
SARS-COV-2 RNA RESP QL NAA+PROBE: NOT DETECTED
SERVICE CMNT-IMP: NORMAL
SERVICE CMNT-IMP: NORMAL

## 2021-09-13 ENCOUNTER — OFFICE VISIT (OUTPATIENT)
Dept: INTERNAL MEDICINE CLINIC | Facility: CLINIC | Age: 36
End: 2021-09-13
Payer: COMMERCIAL

## 2021-09-13 VITALS
RESPIRATION RATE: 17 BRPM | HEART RATE: 78 BPM | SYSTOLIC BLOOD PRESSURE: 120 MMHG | BODY MASS INDEX: 35.16 KG/M2 | HEIGHT: 67 IN | WEIGHT: 224 LBS | OXYGEN SATURATION: 99 % | DIASTOLIC BLOOD PRESSURE: 76 MMHG

## 2021-09-13 DIAGNOSIS — R73.03 PREDIABETES: ICD-10-CM

## 2021-09-13 DIAGNOSIS — E66.9 OBESITY WITH BODY MASS INDEX (BMI) OF 35.0 TO 39.9 WITHOUT COMORBIDITY: ICD-10-CM

## 2021-09-13 DIAGNOSIS — E78.6 LOW HDL (UNDER 40): ICD-10-CM

## 2021-09-13 DIAGNOSIS — Z51.81 THERAPEUTIC DRUG MONITORING: Primary | ICD-10-CM

## 2021-09-13 PROCEDURE — 3074F SYST BP LT 130 MM HG: CPT | Performed by: NURSE PRACTITIONER

## 2021-09-13 PROCEDURE — 3078F DIAST BP <80 MM HG: CPT | Performed by: NURSE PRACTITIONER

## 2021-09-13 PROCEDURE — 99214 OFFICE O/P EST MOD 30 MIN: CPT | Performed by: NURSE PRACTITIONER

## 2021-09-13 PROCEDURE — 3008F BODY MASS INDEX DOCD: CPT | Performed by: NURSE PRACTITIONER

## 2021-09-13 RX ORDER — PHENTERMINE HYDROCHLORIDE 37.5 MG/1
37.5 TABLET ORAL
Qty: 30 TABLET | Refills: 1 | Status: SHIPPED | OUTPATIENT
Start: 2021-09-13

## 2021-09-13 RX ORDER — PHENTERMINE HYDROCHLORIDE 37.5 MG/1
37.5 TABLET ORAL
Qty: 30 TABLET | Refills: 0 | Status: CANCELLED | OUTPATIENT
Start: 2021-09-13

## 2021-09-13 NOTE — PROGRESS NOTES
HISTORY OF PRESENT ILLNESS  Patient presents with:  Weight Check: down 16    Clau Oliveira is a 43year old female here for follow up with medical weight loss program for the treatment of overweight, obesity, or morbid obesity.      Down 16 lbs  Compliant pink, sclera non icteric, PERRLA  NECK: supple, no adenopathy  LUNGS: CTA in all fields, breathing non labored  CARDIO: RRR without murmur  GI: +BS, NT/ND, no masses or HSM  EXTREMITIES: no cyanosis, no clubbing, no edema  PSYCH: pleasant, cooperative, nor Calculations  Initial Weight: 244 lbs  Initial Weight Date: 06/30/21  Today's Weight: 224 lbs  5% Goal: 12.2  10% Goal: 24.4  Total Weight Loss: 20 lbs  Total weight loss: Down 16 lbs total, Net loss 20 lbs  · Continue with medications: phentermine 37.5mg

## 2021-09-13 NOTE — PATIENT INSTRUCTIONS
We are here to support you with weight loss, but please remember that you still need your primary care provider for your routine health maintenance.       PLAN:  Will continue with phentermine 37.5mg   Follow up with me in 4-8 weeks  Schedule follow up appo INPUT> Look at nutrition section-- \"nutrients\" and it will break down your macros for the day (ie. Protein, carbs, fibers, sugars and fats). Try to stay within these numbers daily     2.  \"7 minute workout\" to help with exercise/activity which takes 7 m

## 2021-09-23 ENCOUNTER — OFFICE VISIT (OUTPATIENT)
Dept: FAMILY MEDICINE CLINIC | Facility: CLINIC | Age: 36
End: 2021-09-23
Payer: COMMERCIAL

## 2021-09-23 VITALS
BODY MASS INDEX: 35.16 KG/M2 | RESPIRATION RATE: 16 BRPM | DIASTOLIC BLOOD PRESSURE: 70 MMHG | SYSTOLIC BLOOD PRESSURE: 118 MMHG | HEART RATE: 74 BPM | WEIGHT: 224 LBS | TEMPERATURE: 98 F | HEIGHT: 67 IN

## 2021-09-23 DIAGNOSIS — Z80.3 FAMILY HISTORY OF BREAST CANCER: ICD-10-CM

## 2021-09-23 DIAGNOSIS — E04.1 BENIGN THYROID CYST: ICD-10-CM

## 2021-09-23 DIAGNOSIS — E01.0 THYROMEGALY: ICD-10-CM

## 2021-09-23 DIAGNOSIS — N80.9 ENDOMETRIOSIS: ICD-10-CM

## 2021-09-23 DIAGNOSIS — Z00.00 WELL ADULT EXAM: Primary | ICD-10-CM

## 2021-09-23 PROCEDURE — 3074F SYST BP LT 130 MM HG: CPT | Performed by: FAMILY MEDICINE

## 2021-09-23 PROCEDURE — 3078F DIAST BP <80 MM HG: CPT | Performed by: FAMILY MEDICINE

## 2021-09-23 PROCEDURE — 99395 PREV VISIT EST AGE 18-39: CPT | Performed by: FAMILY MEDICINE

## 2021-09-23 PROCEDURE — 3008F BODY MASS INDEX DOCD: CPT | Performed by: FAMILY MEDICINE

## 2021-09-23 NOTE — PROGRESS NOTES
HPI:   Judi Camarena is a 43year old female that presents for wellness exam.   Patient presents with:  Physical: Routine annual physical with fasting labs due    Patient has hx of endometriosis. Had two children naturally.    There is a family hx of robert REVIEW OF SYSTEMS:       Review of Systems   Constitutional: Negative for fever, chills and fatigue. No distress. HENT: Negative for hearing loss, congestion, sore throat, neck pain and dental problem.     Eyes: Negative for pain and visual disturb turgor. HEART:  Regular rate and rhythm, no murmurs, rubs or gallops. LUNGS: Clear to auscultation bilterally, no rales/rhonchi/wheezing. ABDOMEN:  Soft, nondistended, nontender, bowel sounds normal in all 4 quadrants, no masses, no hepatosplenomegaly.

## 2021-09-28 ENCOUNTER — LAB ENCOUNTER (OUTPATIENT)
Dept: LAB | Age: 36
End: 2021-09-28
Attending: FAMILY MEDICINE
Payer: COMMERCIAL

## 2021-09-28 DIAGNOSIS — Z00.00 WELL ADULT EXAM: ICD-10-CM

## 2021-09-28 PROCEDURE — 80061 LIPID PANEL: CPT

## 2021-09-28 PROCEDURE — 84439 ASSAY OF FREE THYROXINE: CPT

## 2021-09-28 PROCEDURE — 36415 COLL VENOUS BLD VENIPUNCTURE: CPT

## 2021-09-28 PROCEDURE — 84443 ASSAY THYROID STIM HORMONE: CPT

## 2021-09-28 PROCEDURE — 80053 COMPREHEN METABOLIC PANEL: CPT

## 2021-09-28 PROCEDURE — 85025 COMPLETE CBC W/AUTO DIFF WBC: CPT

## 2021-09-30 ENCOUNTER — WALK IN (OUTPATIENT)
Dept: URGENT CARE | Age: 36
End: 2021-09-30

## 2021-09-30 VITALS
RESPIRATION RATE: 18 BRPM | HEIGHT: 67 IN | BODY MASS INDEX: 35.31 KG/M2 | WEIGHT: 225 LBS | HEART RATE: 94 BPM | OXYGEN SATURATION: 98 % | TEMPERATURE: 95.4 F | DIASTOLIC BLOOD PRESSURE: 74 MMHG | SYSTOLIC BLOOD PRESSURE: 110 MMHG

## 2021-09-30 DIAGNOSIS — B34.9 VIRAL SYNDROME: ICD-10-CM

## 2021-09-30 DIAGNOSIS — R07.0 THROAT PAIN: ICD-10-CM

## 2021-09-30 DIAGNOSIS — J02.9 ACUTE PHARYNGITIS, UNSPECIFIED ETIOLOGY: Primary | ICD-10-CM

## 2021-09-30 PROCEDURE — 99214 OFFICE O/P EST MOD 30 MIN: CPT | Performed by: NURSE PRACTITIONER

## 2021-09-30 PROCEDURE — U0005 INFEC AGEN DETEC AMPLI PROBE: HCPCS | Performed by: NURSE PRACTITIONER

## 2021-09-30 PROCEDURE — U0003 INFECTIOUS AGENT DETECTION BY NUCLEIC ACID (DNA OR RNA); SEVERE ACUTE RESPIRATORY SYNDROME CORONAVIRUS 2 (SARS-COV-2) (CORONAVIRUS DISEASE [COVID-19]), AMPLIFIED PROBE TECHNIQUE, MAKING USE OF HIGH THROUGHPUT TECHNOLOGIES AS DESCRIBED BY CMS-2020-01-R: HCPCS | Performed by: NURSE PRACTITIONER

## 2021-09-30 RX ORDER — AZITHROMYCIN 250 MG/1
TABLET, FILM COATED ORAL
Qty: 6 TABLET | Refills: 0 | Status: SHIPPED | OUTPATIENT
Start: 2021-09-30

## 2021-09-30 RX ORDER — BENZONATATE 100 MG/1
100 CAPSULE ORAL 3 TIMES DAILY PRN
Qty: 21 CAPSULE | Refills: 0 | Status: SHIPPED | OUTPATIENT
Start: 2021-09-30

## 2021-09-30 ASSESSMENT — ENCOUNTER SYMPTOMS
PSYCHIATRIC NEGATIVE: 1
CHEST TIGHTNESS: 0
NUMBNESS: 0
FEVER: 0
TROUBLE SWALLOWING: 1
SHORTNESS OF BREATH: 0
EYES NEGATIVE: 1
STRIDOR: 0
COUGH: 1
FATIGUE: 0
SORE THROAT: 1
HEADACHES: 1
RHINORRHEA: 1
CHILLS: 0
APPETITE CHANGE: 1

## 2021-09-30 ASSESSMENT — PAIN SCALES - GENERAL: PAINLEVEL: 7

## 2021-10-01 LAB
SARS-COV-2 RNA RESP QL NAA+PROBE: NOT DETECTED
SERVICE CMNT-IMP: NORMAL
SERVICE CMNT-IMP: NORMAL

## 2021-10-05 ENCOUNTER — PATIENT MESSAGE (OUTPATIENT)
Dept: FAMILY MEDICINE CLINIC | Facility: CLINIC | Age: 36
End: 2021-10-05

## 2021-10-05 DIAGNOSIS — R73.03 PREDIABETES: Primary | ICD-10-CM

## 2021-10-05 NOTE — TELEPHONE ENCOUNTER
From: Andrea Moreno  To: Mickey Faustin DO  Sent: 10/5/2021 11:49 AM CDT  Subject: Question regarding COMP METABOLIC PANEL (14)    Last year you were concerned about my A1C did that improve?     Thanks,    Lindsay Arzate

## 2021-10-06 NOTE — TELEPHONE ENCOUNTER
I had not order it at the time. I did reorder it she can get it done along with a CBC or if she wants to know sooner she can go to the lab and get it done sooner.

## 2021-10-18 ENCOUNTER — OFFICE VISIT (OUTPATIENT)
Dept: INTERNAL MEDICINE CLINIC | Facility: CLINIC | Age: 36
End: 2021-10-18
Payer: COMMERCIAL

## 2021-10-18 VITALS
WEIGHT: 222 LBS | HEIGHT: 67 IN | HEART RATE: 78 BPM | DIASTOLIC BLOOD PRESSURE: 78 MMHG | BODY MASS INDEX: 34.84 KG/M2 | SYSTOLIC BLOOD PRESSURE: 120 MMHG | RESPIRATION RATE: 16 BRPM

## 2021-10-18 DIAGNOSIS — R73.03 PREDIABETES: ICD-10-CM

## 2021-10-18 DIAGNOSIS — E66.9 OBESITY WITH BODY MASS INDEX (BMI) OF 35.0 TO 39.9 WITHOUT COMORBIDITY: ICD-10-CM

## 2021-10-18 DIAGNOSIS — Z51.81 THERAPEUTIC DRUG MONITORING: Primary | ICD-10-CM

## 2021-10-18 DIAGNOSIS — E78.6 LOW HDL (UNDER 40): ICD-10-CM

## 2021-10-18 PROCEDURE — 99214 OFFICE O/P EST MOD 30 MIN: CPT | Performed by: NURSE PRACTITIONER

## 2021-10-18 PROCEDURE — 3078F DIAST BP <80 MM HG: CPT | Performed by: NURSE PRACTITIONER

## 2021-10-18 PROCEDURE — 3008F BODY MASS INDEX DOCD: CPT | Performed by: NURSE PRACTITIONER

## 2021-10-18 PROCEDURE — 3074F SYST BP LT 130 MM HG: CPT | Performed by: NURSE PRACTITIONER

## 2021-10-18 RX ORDER — DOXYCYCLINE HYCLATE 50 MG/1
325 CAPSULE, GELATIN COATED ORAL
COMMUNITY

## 2021-10-18 RX ORDER — PHENTERMINE HYDROCHLORIDE 37.5 MG/1
37.5 TABLET ORAL
Qty: 30 TABLET | Refills: 1 | Status: CANCELLED | OUTPATIENT
Start: 2021-10-18

## 2021-10-18 NOTE — PROGRESS NOTES
HISTORY OF PRESENT ILLNESS  Patient presents with:  Weight Check: down 2 lb    Clau Johnson is a 43year old female here for follow up with medical weight loss program for the treatment of overweight, obesity, or morbid obesity.      Down 2 lbs  Complian GENERAL: well developed, well nourished, in no apparent distress, A/O x3  SKIN: no rashes, no suspicious lesions  HEENT: conjunctiva pink, sclera non icteric, PERRLA  NECK: supple, no adenopathy  LUNGS: CTA in all fields, breathing non labored  CARDIO: R Preglaucoma ocular hypertension      PLAN   Initial Weight Data and Goal Weight Loss:  Initial consult: #244 lbs on 6/2021  Weight Calculations  Initial Weight: 244 lbs  Initial Weight Date: 06/30/21  Today's Weight: 222 lbs  5% Goal: 12.2  10% Goal: 24.4

## 2021-10-18 NOTE — PATIENT INSTRUCTIONS
We are here to support you with weight loss, but please remember that you still need your primary care provider for your routine health maintenance.       PLAN:  Will continue with phentermine and if having issues with sleep again (contact us)  Try to add i active (can't count exercise towards calorie number per day)                   ** Daily INPUT> Look at nutrition section-- \"nutrients\" and it will break down your macros for the day (ie. Protein, carbs, fibers, sugars and fats).  Try to stay within these for 3 meals/day  2. Drink lots of water and cut down on soda/juice consumption if soda/juice drinker  3. Focus on protein: (15-30 grams with each meal) ie. greek yogurt, cottage cheese, string cheese, hard boiled eggs  4.  Healthy snacks: peanut butter and butters  -dried edamame   -fermin seeds soaked in water or almond milk  -soy nuts  -cured meats (monitor for sodium issues)   -hummus with vegetables  -bean dip with vegetables     FRUIT  Low carb fruit options   Raspberries: Half a cup (60 grams) contains 3

## 2021-10-25 ENCOUNTER — TELEPHONE (OUTPATIENT)
Dept: INTERNAL MEDICINE CLINIC | Facility: CLINIC | Age: 36
End: 2021-10-25

## 2021-10-25 NOTE — TELEPHONE ENCOUNTER
Left message for patient to call back to verify how she is taking Fluticasone propionate 50mcg.  In the chart the directions currently  have a misprint \"SPRAY 1 2 SPRAYS INTO EACH NOSTRIL EVERY DAY\"calling to make sure patient is taking 1-2 sprays in each

## 2021-10-25 NOTE — TELEPHONE ENCOUNTER
Patient returned call and verified she is using Fluticasone nasal spray 1-2 sprays into each nostril daily as needed. (not 12). She is aware of proper directions and will continue.

## 2021-12-16 ENCOUNTER — OFFICE VISIT (OUTPATIENT)
Dept: FAMILY MEDICINE CLINIC | Facility: CLINIC | Age: 36
End: 2021-12-16
Payer: COMMERCIAL

## 2021-12-16 VITALS
DIASTOLIC BLOOD PRESSURE: 72 MMHG | HEART RATE: 76 BPM | SYSTOLIC BLOOD PRESSURE: 114 MMHG | RESPIRATION RATE: 16 BRPM | HEIGHT: 67 IN | OXYGEN SATURATION: 98 % | WEIGHT: 225 LBS | BODY MASS INDEX: 35.31 KG/M2 | TEMPERATURE: 98 F

## 2021-12-16 DIAGNOSIS — R22.32 MASS OF LEFT AXILLA: Primary | ICD-10-CM

## 2021-12-16 PROCEDURE — 3074F SYST BP LT 130 MM HG: CPT | Performed by: FAMILY MEDICINE

## 2021-12-16 PROCEDURE — 99213 OFFICE O/P EST LOW 20 MIN: CPT | Performed by: FAMILY MEDICINE

## 2021-12-16 PROCEDURE — 3078F DIAST BP <80 MM HG: CPT | Performed by: FAMILY MEDICINE

## 2021-12-16 PROCEDURE — 3008F BODY MASS INDEX DOCD: CPT | Performed by: FAMILY MEDICINE

## 2021-12-16 NOTE — PROGRESS NOTES
Note to patient: The [facility] makes medical notes like these available to patients in the interest of transparency. However, be advised this is a medical document. It is intended as peer to peer communication.  It is written in medical languag Problem Relation Age of Onset   • Diabetes Father    • Cancer Father         sarcoma   • Cancer Mother    • Breast Cancer Mother         age 42's   • Cancer Maternal Grandmother         BREAST   • Breast Cancer Maternal Grandmother         age 29's   • H deformity, asymmetry. Normal contours. No nodules, masses, tenderness, or axillary adenopathy. No nipple discharge. Right axilla: nontender no lumps  Left axilla close to tail of left breast there is a pea sized nontender lesion.        A/P:    Mass of lef

## 2021-12-29 ENCOUNTER — TELEPHONE (OUTPATIENT)
Dept: FAMILY MEDICINE CLINIC | Facility: CLINIC | Age: 36
End: 2021-12-29

## 2021-12-29 DIAGNOSIS — R22.32 MASS OF LEFT AXILLA: ICD-10-CM

## 2021-12-29 DIAGNOSIS — Z80.3 FAMILY HISTORY OF BREAST CANCER: Primary | ICD-10-CM

## 2021-12-29 NOTE — TELEPHONE ENCOUNTER
Julius Rojas from [facility] called stating that if order can be changed to [facility]. [facility] diagnostic bilateral based on where patient states pain is coming from. Also to check \"yes\" on follow up US that way that can be performed. Order pended.     Please tu

## 2022-01-13 ENCOUNTER — HOSPITAL ENCOUNTER (OUTPATIENT)
Dept: MAMMOGRAPHY | Age: 37
Discharge: HOME | End: 2022-01-13
Attending: FAMILY MEDICINE
Payer: COMMERCIAL

## 2022-01-13 ENCOUNTER — HOSPITAL ENCOUNTER (OUTPATIENT)
Dept: ULTRASOUND IMAGING | Age: 37
Discharge: HOME | End: 2022-01-13
Attending: FAMILY MEDICINE
Payer: COMMERCIAL

## 2022-01-13 DIAGNOSIS — R22.32 MASS OF LEFT AXILLA: ICD-10-CM

## 2022-01-13 DIAGNOSIS — Z80.3 FAMILY HISTORY OF BREAST CANCER: ICD-10-CM

## 2022-01-13 PROCEDURE — 77066 DX MAMMO INCL CAD BI: CPT | Performed by: FAMILY MEDICINE

## 2022-01-13 PROCEDURE — 76642 ULTRASOUND BREAST LIMITED: CPT | Performed by: FAMILY MEDICINE

## 2022-01-13 PROCEDURE — 77062 BREAST TOMOSYNTHESIS BI: CPT | Performed by: FAMILY MEDICINE

## 2022-03-09 ENCOUNTER — OFFICE VISIT (OUTPATIENT)
Dept: INTERNAL MEDICINE CLINIC | Facility: CLINIC | Age: 37
End: 2022-03-09
Payer: COMMERCIAL

## 2022-03-09 VITALS
HEART RATE: 82 BPM | RESPIRATION RATE: 15 BRPM | HEIGHT: 67 IN | OXYGEN SATURATION: 98 % | BODY MASS INDEX: 37.2 KG/M2 | WEIGHT: 237 LBS | DIASTOLIC BLOOD PRESSURE: 80 MMHG | SYSTOLIC BLOOD PRESSURE: 120 MMHG

## 2022-03-09 DIAGNOSIS — R73.03 PREDIABETES: ICD-10-CM

## 2022-03-09 DIAGNOSIS — E78.6 LOW HDL (UNDER 40): ICD-10-CM

## 2022-03-09 DIAGNOSIS — Z51.81 THERAPEUTIC DRUG MONITORING: Primary | ICD-10-CM

## 2022-03-09 DIAGNOSIS — F43.9 STRESS: ICD-10-CM

## 2022-03-09 DIAGNOSIS — E66.9 OBESITY WITH BODY MASS INDEX (BMI) OF 35.0 TO 39.9 WITHOUT COMORBIDITY: ICD-10-CM

## 2022-03-09 PROCEDURE — 99214 OFFICE O/P EST MOD 30 MIN: CPT | Performed by: NURSE PRACTITIONER

## 2022-03-09 PROCEDURE — 3074F SYST BP LT 130 MM HG: CPT | Performed by: NURSE PRACTITIONER

## 2022-03-09 PROCEDURE — 3008F BODY MASS INDEX DOCD: CPT | Performed by: NURSE PRACTITIONER

## 2022-03-09 PROCEDURE — 3079F DIAST BP 80-89 MM HG: CPT | Performed by: NURSE PRACTITIONER

## 2022-03-09 RX ORDER — PHENTERMINE HYDROCHLORIDE 37.5 MG/1
37.5 TABLET ORAL
Qty: 30 TABLET | Refills: 1 | Status: SHIPPED | OUTPATIENT
Start: 2022-03-09

## 2022-03-09 NOTE — PATIENT INSTRUCTIONS
We are here to support you with weight loss, but please remember that you still need your primary care provider for your routine health maintenance. PLAN:  Will restart phentermine 37.5mg (cut tablet in 1/2 X 4 days) and then take the full dose  Follow up with me in 4 weeks  Schedule follow up appointments: Jeremy Chen (dietitian) or Mercedez Bonilla (presurgery dietitian)   Check for insurance coverage for dietitian and labwork prior to scheduling appointment. Please try to work on the following dietary changes:  1. Goals: Aim for 20-30 grams of protein/ meal  i. Aim for 130 grams of carbohydrates/day  ii. Eat 4-6 vegetables/day  iii. Avoid skipping meals- eat every 4-5 hours  iv. Aim for 3 meals/day  2. Drink lots of water and cut down on soda/juice consumption if soda/juice drinker  3. Focus on protein: (15-30 grams with each meal) ie. greek yogurt, cottage cheese, string cheese, hard boiled eggs  4. Healthy snacks: peanut butter and apples, hummus and carrots, berries, nuts (1/4 cup), tuna and crackers                 Protein Shakes: Premier protein or Core Power                Protein Bars: Rx Bars, Oatmega, Power Crunch                 [employer] balanced breaks (cheese and nuts)- without chocolate  5. Reduce carbohydrates which includes sweets as well as rice, pasta, potatoes, bread, corn and instead choose whole grain options or more protein or vegetables (4-6 servings of vegetables per day)  6. Get a good night of sleep  7. Try to decrease stress in life     Please download apps:  1. \"My Fitness Pal\" (other option is Lose it)) to help you to monitor daily dietary intake and you will be able to see if you are eating the right amount of calories, protein, carbs                With My Fitness Pal-->When you set-up the teresa or need to adjust settings:                Goals should include:                  Lose 1.5-2 lbs per week                Activity level: not very active (can't count exercise towards calorie number per day)                   ** Daily INPUT> Look at nutrition section-- \"nutrients\" and it will break down your macros for the day (ie. Protein, carbs, fibers, sugars and fats). Try to stay within these numbers daily     2. \"7 minute workout\" to help with exercise/activity which takes 7 minutes of your day and that you can do at home! 3. \"Calm\" or \"Headspace\" which helps with mindfulness, meditation, clarity, sleep, and luciano to your daily life. 4. [employer] blog for healthy recipe ideas  5. [employer] for low carb resources    HIGH PROTEIN SNACK IDEAS  -cottage cheese  -plain yogurt  -kefir  -hard-boiled eggs  -natural cheeses  -nuts (measure portion size)   -unsweetened nut butters  -dried edamame   -fermin seeds soaked in water or almond milk  -soy nuts  -cured meats (monitor for sodium issues)   -hummus with vegetables  -bean dip with vegetables     FRUIT  Low carb fruit options   Raspberries: Half a cup (60 grams) contains 3 grams of carbs. Blackberries: Half a cup (70 grams) contains 4 grams of carbs. Strawberries: Half a cup (100 grams) contains 6 grams of carbs. Blueberries: Half a cup (50 grams) contains 6 grams of carbs. Plum: One medium-sized (80 grams) contains 6 grams of carbs.      VEGETABLES  Low carb vegetables

## 2022-04-19 ENCOUNTER — OFFICE VISIT (OUTPATIENT)
Dept: INTERNAL MEDICINE CLINIC | Facility: CLINIC | Age: 37
End: 2022-04-19
Payer: COMMERCIAL

## 2022-04-19 VITALS
OXYGEN SATURATION: 99 % | HEIGHT: 67 IN | DIASTOLIC BLOOD PRESSURE: 80 MMHG | BODY MASS INDEX: 36.88 KG/M2 | WEIGHT: 235 LBS | SYSTOLIC BLOOD PRESSURE: 118 MMHG | RESPIRATION RATE: 16 BRPM | HEART RATE: 81 BPM

## 2022-04-19 DIAGNOSIS — E78.6 LOW HDL (UNDER 40): ICD-10-CM

## 2022-04-19 DIAGNOSIS — R73.03 PREDIABETES: ICD-10-CM

## 2022-04-19 DIAGNOSIS — E66.9 OBESITY WITH BODY MASS INDEX (BMI) OF 35.0 TO 39.9 WITHOUT COMORBIDITY: ICD-10-CM

## 2022-04-19 DIAGNOSIS — Z51.81 THERAPEUTIC DRUG MONITORING: Primary | ICD-10-CM

## 2022-04-19 DIAGNOSIS — F43.9 STRESS: ICD-10-CM

## 2022-04-19 PROCEDURE — 99213 OFFICE O/P EST LOW 20 MIN: CPT | Performed by: NURSE PRACTITIONER

## 2022-04-19 PROCEDURE — 3008F BODY MASS INDEX DOCD: CPT | Performed by: NURSE PRACTITIONER

## 2022-04-19 PROCEDURE — 3074F SYST BP LT 130 MM HG: CPT | Performed by: NURSE PRACTITIONER

## 2022-04-19 PROCEDURE — 3079F DIAST BP 80-89 MM HG: CPT | Performed by: NURSE PRACTITIONER

## 2022-04-19 RX ORDER — PHENTERMINE HYDROCHLORIDE 37.5 MG/1
37.5 TABLET ORAL
Qty: 30 TABLET | Refills: 1 | Status: CANCELLED | OUTPATIENT
Start: 2022-04-19

## 2022-04-19 NOTE — PATIENT INSTRUCTIONS
We are here to support you with weight loss, but please remember that you still need your primary care provider for your routine health maintenance. PLAN:  Will continue with phentermine    Follow up with me in 4 weeks  Schedule follow up appointments: Dominique Bonner (dietitian) or Yoan Velez (presurgery dietitian)   Check for insurance coverage for dietitian and labwork prior to scheduling appointment. Please try to work on the following dietary changes:  1. Goals: Aim for 20-30 grams of protein/ meal  i. Aim for 120 grams of carbohydrates/day  ii. Eat 4-6 vegetables/day  iii. Avoid skipping meals- eat every 4-5 hours  iv. Aim for 3 meals/day  2. Drink lots of water and cut down on soda/juice consumption if soda/juice drinker  3. Focus on protein: (15-30 grams with each meal) ie. greek yogurt, cottage cheese, string cheese, hard boiled eggs  4. Healthy snacks: peanut butter and apples, hummus and carrots, berries, nuts (1/4 cup), tuna and crackers                 Protein Shakes: Premier protein or Core Power                Protein Bars: Rx Bars, Oatmega, Power Crunch                 Sargento balanced breaks (cheese and nuts)- without chocolate  5. Reduce carbohydrates which includes sweets as well as rice, pasta, potatoes, bread, corn and instead choose whole grain options or more protein or vegetables (4-6 servings of vegetables per day)  6. Get a good night of sleep  7. Try to decrease stress in life     Please download apps:  1. \"My Fitness Pal\" (other option is Lose it)) to help you to monitor daily dietary intake and you will be able to see if you are eating the right amount of calories, protein, carbs                With My Fitness Pal-->When you set-up the teresa or need to adjust settings:                Goals should include:                  Lose 1.5-2 lbs per week                Activity level: not very active (can't count exercise towards calorie number per day)                   ** Daily INPUT> Look at nutrition section-- \"nutrients\" and it will break down your macros for the day (ie. Protein, carbs, fibers, sugars and fats). Try to stay within these numbers daily     2. \"7 minute workout\" to help with exercise/activity which takes 7 minutes of your day and that you can do at home! 3. \"Calm\" or \"Headspace\" which helps with mindfulness, meditation, clarity, sleep, and luciano to your daily life. 4. [employer] blog for healthy recipe ideas  5. Million-2-1 for low carb resources    HIGH PROTEIN SNACK IDEAS  -cottage cheese  -plain yogurt  -kefir  -hard-boiled eggs  -natural cheeses  -nuts (measure portion size)   -unsweetened nut butters  -dried edamame   -fermin seeds soaked in water or almond milk  -soy nuts  -cured meats (monitor for sodium issues)   -hummus with vegetables  -bean dip with vegetables     FRUIT  Low carb fruit options   Raspberries: Half a cup (60 grams) contains 3 grams of carbs. Blackberries: Half a cup (70 grams) contains 4 grams of carbs. Strawberries: Half a cup (100 grams) contains 6 grams of carbs. Blueberries: Half a cup (50 grams) contains 6 grams of carbs. Plum: One medium-sized (80 grams) contains 6 grams of carbs.      VEGETABLES  Low carb vegetables

## 2022-07-26 ENCOUNTER — TELEPHONE (OUTPATIENT)
Dept: SCHEDULING | Age: 37
End: 2022-07-26

## 2022-09-06 ENCOUNTER — OFFICE VISIT (OUTPATIENT)
Dept: INTERNAL MEDICINE CLINIC | Facility: CLINIC | Age: 37
End: 2022-09-06
Payer: COMMERCIAL

## 2022-09-06 VITALS
HEIGHT: 67 IN | WEIGHT: 243.63 LBS | RESPIRATION RATE: 18 BRPM | SYSTOLIC BLOOD PRESSURE: 122 MMHG | DIASTOLIC BLOOD PRESSURE: 80 MMHG | HEART RATE: 80 BPM | BODY MASS INDEX: 38.24 KG/M2 | OXYGEN SATURATION: 99 %

## 2022-09-06 DIAGNOSIS — R73.03 PREDIABETES: ICD-10-CM

## 2022-09-06 DIAGNOSIS — F43.9 STRESS: ICD-10-CM

## 2022-09-06 DIAGNOSIS — E66.9 OBESITY WITH BODY MASS INDEX (BMI) OF 35.0 TO 39.9 WITHOUT COMORBIDITY: ICD-10-CM

## 2022-09-06 DIAGNOSIS — E78.6 LOW HDL (UNDER 40): ICD-10-CM

## 2022-09-06 DIAGNOSIS — Z51.81 THERAPEUTIC DRUG MONITORING: Primary | ICD-10-CM

## 2022-09-06 PROCEDURE — 3008F BODY MASS INDEX DOCD: CPT | Performed by: NURSE PRACTITIONER

## 2022-09-06 PROCEDURE — 99214 OFFICE O/P EST MOD 30 MIN: CPT | Performed by: NURSE PRACTITIONER

## 2022-09-06 PROCEDURE — 3079F DIAST BP 80-89 MM HG: CPT | Performed by: NURSE PRACTITIONER

## 2022-09-06 PROCEDURE — 3074F SYST BP LT 130 MM HG: CPT | Performed by: NURSE PRACTITIONER

## 2022-09-06 RX ORDER — PHENTERMINE HYDROCHLORIDE 37.5 MG/1
37.5 TABLET ORAL
Qty: 30 TABLET | Refills: 2 | Status: SHIPPED | OUTPATIENT
Start: 2022-09-06

## 2022-09-06 NOTE — PATIENT INSTRUCTIONS
We are here to support you with weight loss, but please remember that you still need your primary care provider for your routine health maintenance. PLAN:  Will restart phentermine 37.5mg daily (can cut tablet in 1/2 if you would like) for the first couple of days  Follow up with me in 6-8 weeks  Schedule follow up appointments: Darinel Flores (dietitian) or Luke Ortiz (presurgery dietitian)   Check for insurance coverage for dietitian and labwork prior to scheduling appointment. Please try to work on the following dietary changes:  1. Goals: Aim for 20-30 grams of protein/ meal  i. Aim for 140 grams of carbohydrates/day  ii. Eat 4-6 vegetables/day  iii. Avoid skipping meals- eat every 4-5 hours  iv. Aim for 3 meals/day  2. Drink lots of water and cut down on soda/juice consumption if soda/juice drinker  3. Focus on protein: (15-30 grams with each meal) ie. greek yogurt, cottage cheese, string cheese, hard boiled eggs  4. Healthy snacks: peanut butter and apples, hummus and carrots, berries, nuts (1/4 cup), tuna and crackers                 Protein Shakes: Premier protein or Core Power                Protein Bars: Rx Bars, Oatmega, Power Crunch                 Sargento balanced breaks (cheese and nuts)- without chocolate  5. Reduce carbohydrates which includes sweets as well as rice, pasta, potatoes, bread, corn and instead choose whole grain options or more protein or vegetables (4-6 servings of vegetables per day)  6. Get a good night of sleep  7. Try to decrease stress in life     Please download apps:  1. \"My Fitness Pal\" (other option is Lose it)) to help you to monitor daily dietary intake and you will be able to see if you are eating the right amount of calories, protein, carbs                With My Fitness Pal-->When you set-up the teresa or need to adjust settings:                Goals should include:                  Lose 1.5-2 lbs per week                Activity level: not very active (can't count exercise towards calorie number per day)                   ** Daily INPUT> Look at nutrition section-- \"nutrients\" and it will break down your macros for the day (ie. Protein, carbs, fibers, sugars and fats). Try to stay within these numbers daily     2. \"7 minute workout\" to help with exercise/activity which takes 7 minutes of your day and that you can do at home! 3. \"Calm\" or \"Headspace\" which helps with mindfulness, meditation, clarity, sleep, and luciano to your daily life. 4. [employer] blog for healthy recipe ideas  5. [employer] NetTV for low carb resources    HIGH PROTEIN SNACK IDEAS  -cottage cheese  -plain yogurt  -kefir  -hard-boiled eggs  -natural cheeses  -nuts (measure portion size)   -unsweetened nut butters  -dried edamame   -fermin seeds soaked in water or almond milk  -soy nuts  -cured meats (monitor for sodium issues)   -hummus with vegetables  -bean dip with vegetables     FRUIT  Low carb fruit options   Raspberries: Half a cup (60 grams) contains 3 grams of carbs. Blackberries: Half a cup (70 grams) contains 4 grams of carbs. Strawberries: Half a cup (100 grams) contains 6 grams of carbs. Blueberries: Half a cup (50 grams) contains 6 grams of carbs. Plum: One medium-sized (80 grams) contains 6 grams of carbs.      VEGETABLES  Low carb vegetables

## 2022-10-05 ENCOUNTER — OFFICE VISIT (OUTPATIENT)
Dept: INTERNAL MEDICINE CLINIC | Facility: CLINIC | Age: 37
End: 2022-10-05
Payer: COMMERCIAL

## 2022-10-05 VITALS
OXYGEN SATURATION: 97 % | HEIGHT: 67 IN | WEIGHT: 234 LBS | RESPIRATION RATE: 18 BRPM | DIASTOLIC BLOOD PRESSURE: 80 MMHG | SYSTOLIC BLOOD PRESSURE: 116 MMHG | BODY MASS INDEX: 36.73 KG/M2 | HEART RATE: 94 BPM

## 2022-10-05 DIAGNOSIS — E78.6 LOW HDL (UNDER 40): ICD-10-CM

## 2022-10-05 DIAGNOSIS — E66.9 OBESITY WITH BODY MASS INDEX (BMI) OF 35.0 TO 39.9 WITHOUT COMORBIDITY: ICD-10-CM

## 2022-10-05 DIAGNOSIS — F43.9 STRESS: ICD-10-CM

## 2022-10-05 DIAGNOSIS — Z51.81 THERAPEUTIC DRUG MONITORING: Primary | ICD-10-CM

## 2022-10-05 DIAGNOSIS — R73.03 PREDIABETES: ICD-10-CM

## 2022-10-05 PROCEDURE — 99213 OFFICE O/P EST LOW 20 MIN: CPT | Performed by: NURSE PRACTITIONER

## 2022-10-05 PROCEDURE — 3008F BODY MASS INDEX DOCD: CPT | Performed by: NURSE PRACTITIONER

## 2022-10-05 PROCEDURE — 3079F DIAST BP 80-89 MM HG: CPT | Performed by: NURSE PRACTITIONER

## 2022-10-05 PROCEDURE — 3074F SYST BP LT 130 MM HG: CPT | Performed by: NURSE PRACTITIONER

## 2022-10-05 NOTE — PATIENT INSTRUCTIONS
We are here to support you with weight loss, but please remember that you still need your primary care provider for your routine health maintenance. PLAN:  Will continue with phentermine 37.5mg   Follow up with me in 8-12 weeks  Schedule follow up appointments: Geno Cam (dietitian) or Alda Bains (presurgery dietitian)   Check for insurance coverage for dietitian and labwork prior to scheduling appointment. Please try to work on the following dietary changes:  1. Goals: Aim for 20-30 grams of protein/ meal  i. Aim for 130 grams of carbohydrates/day  ii. Eat 4-6 vegetables/day  iii. Avoid skipping meals- eat every 4-5 hours  iv. Aim for 3 meals/day  2. Drink lots of water and cut down on soda/juice consumption if soda/juice drinker  3. Focus on protein: (15-30 grams with each meal) ie. greek yogurt, cottage cheese, string cheese, hard boiled eggs  4. Healthy snacks: peanut butter and apples, hummus and carrots, berries, nuts (1/4 cup), tuna and crackers                 Protein Shakes: Premier protein or Core Power                Protein Bars: Rx Bars, Oatmega, Power Crunch                 Sargento balanced breaks (cheese and nuts)- without chocolate  5. Reduce carbohydrates which includes sweets as well as rice, pasta, potatoes, bread, corn and instead choose whole grain options or more protein or vegetables (4-6 servings of vegetables per day)  6. Get a good night of sleep  7. Try to decrease stress in life     Please download apps:  1. \"My Fitness Pal\" (other option is Lose it)) to help you to monitor daily dietary intake and you will be able to see if you are eating the right amount of calories, protein, carbs                With My Fitness Pal-->When you set-up the teresa or need to adjust settings:                Goals should include:                  Lose 1.5-2 lbs per week                Activity level: not very active (can't count exercise towards calorie number per day)                   ** Daily INPUT> Look at nutrition section-- \"nutrients\" and it will break down your macros for the day (ie. Protein, carbs, fibers, sugars and fats). Try to stay within these numbers daily     2. \"7 minute workout\" to help with exercise/activity which takes 7 minutes of your day and that you can do at home! 3. \"Calm\" or \"Headspace\" which helps with mindfulness, meditation, clarity, sleep, and luciano to your daily life. 4. [employer] blog for healthy recipe ideas  5. CANDDi for low carb resources    HIGH PROTEIN SNACK IDEAS  -cottage cheese  -plain yogurt  -kefir  -hard-boiled eggs  -natural cheeses  -nuts (measure portion size)   -unsweetened nut butters  -dried edamame   -fermin seeds soaked in water or almond milk  -soy nuts  -cured meats (monitor for sodium issues)   -hummus with vegetables  -bean dip with vegetables     FRUIT  Low carb fruit options   Raspberries: Half a cup (60 grams) contains 3 grams of carbs. Blackberries: Half a cup (70 grams) contains 4 grams of carbs. Strawberries: Half a cup (100 grams) contains 6 grams of carbs. Blueberries: Half a cup (50 grams) contains 6 grams of carbs. Plum: One medium-sized (80 grams) contains 6 grams of carbs.      VEGETABLES  Low carb vegetables

## 2022-11-12 ENCOUNTER — TELEPHONE (OUTPATIENT)
Dept: FAMILY MEDICINE CLINIC | Facility: CLINIC | Age: 37
End: 2022-11-12

## 2022-11-12 DIAGNOSIS — Z00.00 WELL ADULT EXAM: Primary | ICD-10-CM

## 2022-11-21 ENCOUNTER — OFFICE VISIT (OUTPATIENT)
Dept: FAMILY MEDICINE CLINIC | Facility: CLINIC | Age: 37
End: 2022-11-21
Payer: COMMERCIAL

## 2022-11-21 VITALS
SYSTOLIC BLOOD PRESSURE: 118 MMHG | WEIGHT: 236 LBS | DIASTOLIC BLOOD PRESSURE: 86 MMHG | OXYGEN SATURATION: 97 % | HEIGHT: 67 IN | HEART RATE: 97 BPM | TEMPERATURE: 97 F | RESPIRATION RATE: 14 BRPM | BODY MASS INDEX: 37.04 KG/M2

## 2022-11-21 DIAGNOSIS — E78.6 LOW HDL (UNDER 40): ICD-10-CM

## 2022-11-21 DIAGNOSIS — N80.9 ENDOMETRIOSIS: ICD-10-CM

## 2022-11-21 DIAGNOSIS — Z23 NEED FOR VACCINATION: ICD-10-CM

## 2022-11-21 DIAGNOSIS — Z23 FLU VACCINE NEED: ICD-10-CM

## 2022-11-21 DIAGNOSIS — R73.03 PREDIABETES: ICD-10-CM

## 2022-11-21 DIAGNOSIS — Z00.00 WELL ADULT EXAM: Primary | ICD-10-CM

## 2022-11-21 DIAGNOSIS — N60.02 BREAST CYST, LEFT: ICD-10-CM

## 2022-11-21 DIAGNOSIS — Z80.3 FAMILY HISTORY OF BREAST CANCER: ICD-10-CM

## 2022-11-21 DIAGNOSIS — E66.9 OBESITY WITH BODY MASS INDEX (BMI) OF 35.0 TO 39.9 WITHOUT COMORBIDITY: ICD-10-CM

## 2022-11-21 DIAGNOSIS — R19.7 DIARRHEA, UNSPECIFIED TYPE: ICD-10-CM

## 2022-11-21 DIAGNOSIS — Z12.31 SCREENING MAMMOGRAM, ENCOUNTER FOR: ICD-10-CM

## 2022-11-21 DIAGNOSIS — Z23 NEED FOR DIPHTHERIA-TETANUS-PERTUSSIS (TDAP) VACCINE: ICD-10-CM

## 2022-11-21 DIAGNOSIS — E01.0 THYROMEGALY: ICD-10-CM

## 2022-11-21 DIAGNOSIS — Z12.31 SCREENING MAMMOGRAM FOR BREAST CANCER: ICD-10-CM

## 2022-11-21 DIAGNOSIS — E04.1 THYROID CYST: ICD-10-CM

## 2022-11-21 RX ORDER — UBIDECARENONE 75 MG
250 CAPSULE ORAL DAILY
COMMUNITY

## 2022-11-21 NOTE — PATIENT INSTRUCTIONS
PLEASE CONTACT breast navigator, Salvador Herrmann, at [phone] REGARDING ADDITIONAL BREAST CANCER SCREENING.

## 2022-12-30 ENCOUNTER — OFFICE VISIT (OUTPATIENT)
Dept: OBGYN CLINIC | Facility: CLINIC | Age: 37
End: 2022-12-30
Payer: COMMERCIAL

## 2022-12-30 VITALS
HEIGHT: 67 IN | DIASTOLIC BLOOD PRESSURE: 76 MMHG | SYSTOLIC BLOOD PRESSURE: 114 MMHG | BODY MASS INDEX: 37.7 KG/M2 | HEART RATE: 82 BPM | WEIGHT: 240.19 LBS

## 2022-12-30 DIAGNOSIS — Z12.4 SCREENING FOR CERVICAL CANCER: ICD-10-CM

## 2022-12-30 DIAGNOSIS — N92.0 MENORRHAGIA WITH REGULAR CYCLE: ICD-10-CM

## 2022-12-30 DIAGNOSIS — Z11.51 SCREENING FOR HUMAN PAPILLOMAVIRUS (HPV): ICD-10-CM

## 2022-12-30 DIAGNOSIS — Z01.419 WELL WOMAN EXAM WITH ROUTINE GYNECOLOGICAL EXAM: Primary | ICD-10-CM

## 2022-12-30 PROCEDURE — 3008F BODY MASS INDEX DOCD: CPT | Performed by: NURSE PRACTITIONER

## 2022-12-30 PROCEDURE — 87624 HPV HI-RISK TYP POOLED RSLT: CPT | Performed by: NURSE PRACTITIONER

## 2022-12-30 PROCEDURE — 3078F DIAST BP <80 MM HG: CPT | Performed by: NURSE PRACTITIONER

## 2022-12-30 PROCEDURE — 3074F SYST BP LT 130 MM HG: CPT | Performed by: NURSE PRACTITIONER

## 2022-12-30 PROCEDURE — 99385 PREV VISIT NEW AGE 18-39: CPT | Performed by: NURSE PRACTITIONER

## 2022-12-30 PROCEDURE — 99212 OFFICE O/P EST SF 10 MIN: CPT | Performed by: NURSE PRACTITIONER

## 2022-12-30 RX ORDER — TRANEXAMIC ACID 650 MG/1
1300 TABLET ORAL EVERY 8 HOURS
Qty: 90 TABLET | Refills: 2 | Status: SHIPPED | OUTPATIENT
Start: 2022-12-30

## 2023-01-02 LAB — HPV I/H RISK 1 DNA SPEC QL NAA+PROBE: NEGATIVE

## 2023-01-06 ENCOUNTER — HOSPITAL ENCOUNTER (OUTPATIENT)
Dept: ULTRASOUND IMAGING | Age: 38
Discharge: HOME | End: 2023-01-06
Attending: FAMILY MEDICINE
Payer: COMMERCIAL

## 2023-01-06 DIAGNOSIS — E01.0 THYROMEGALY: ICD-10-CM

## 2023-01-06 DIAGNOSIS — E04.1 THYROID CYST: ICD-10-CM

## 2023-01-06 PROCEDURE — 76536 US EXAM OF HEAD AND NECK: CPT | Performed by: FAMILY MEDICINE

## 2023-02-03 DIAGNOSIS — Z51.81 THERAPEUTIC DRUG MONITORING: ICD-10-CM

## 2023-02-03 DIAGNOSIS — E66.9 OBESITY WITH BODY MASS INDEX (BMI) OF 35.0 TO 39.9 WITHOUT COMORBIDITY: ICD-10-CM

## 2023-02-03 RX ORDER — PHENTERMINE HYDROCHLORIDE 37.5 MG/1
TABLET ORAL
Qty: 30 TABLET | Refills: 0 | Status: SHIPPED | OUTPATIENT
Start: 2023-02-03

## 2023-02-08 ENCOUNTER — LAB ENCOUNTER (OUTPATIENT)
Dept: LAB | Age: 38
End: 2023-02-08
Attending: FAMILY MEDICINE
Payer: COMMERCIAL

## 2023-02-08 ENCOUNTER — HOSPITAL ENCOUNTER (OUTPATIENT)
Dept: MAMMOGRAPHY | Age: 38
Discharge: HOME | End: 2023-02-08
Attending: FAMILY MEDICINE
Payer: COMMERCIAL

## 2023-02-08 DIAGNOSIS — R73.9 HYPERGLYCEMIA: ICD-10-CM

## 2023-02-08 DIAGNOSIS — R19.7 DIARRHEA, UNSPECIFIED TYPE: ICD-10-CM

## 2023-02-08 DIAGNOSIS — R73.9 HYPERGLYCEMIA: Primary | ICD-10-CM

## 2023-02-08 DIAGNOSIS — Z00.00 WELL ADULT EXAM: ICD-10-CM

## 2023-02-08 DIAGNOSIS — Z12.31 SCREENING MAMMOGRAM FOR BREAST CANCER: ICD-10-CM

## 2023-02-08 LAB
ALBUMIN SERPL-MCNC: 3.8 G/DL (ref 3.4–5)
ALBUMIN/GLOB SERPL: 1.1 {RATIO} (ref 1–2)
ALP LIVER SERPL-CCNC: 25 U/L
ALT SERPL-CCNC: 23 U/L
ANION GAP SERPL CALC-SCNC: 5 MMOL/L (ref 0–18)
AST SERPL-CCNC: 11 U/L (ref 15–37)
BASOPHILS # BLD AUTO: 0.02 X10(3) UL (ref 0–0.2)
BASOPHILS NFR BLD AUTO: 0.4 %
BILIRUB SERPL-MCNC: 0.4 MG/DL (ref 0.1–2)
BUN BLD-MCNC: 9 MG/DL (ref 7–18)
CALCIUM BLD-MCNC: 8.9 MG/DL (ref 8.5–10.1)
CHLORIDE SERPL-SCNC: 109 MMOL/L (ref 98–112)
CHOLEST SERPL-MCNC: 102 MG/DL (ref ?–200)
CO2 SERPL-SCNC: 24 MMOL/L (ref 21–32)
CREAT BLD-MCNC: 0.7 MG/DL
EOSINOPHIL # BLD AUTO: 0.08 X10(3) UL (ref 0–0.7)
EOSINOPHIL NFR BLD AUTO: 1.6 %
ERYTHROCYTE [DISTWIDTH] IN BLOOD BY AUTOMATED COUNT: 13.3 %
EST. AVERAGE GLUCOSE BLD GHB EST-MCNC: 123 MG/DL (ref 68–126)
FASTING PATIENT LIPID ANSWER: YES
FASTING STATUS PATIENT QL REPORTED: YES
GFR SERPLBLD BASED ON 1.73 SQ M-ARVRAT: 113 ML/MIN/1.73M2 (ref 60–?)
GLOBULIN PLAS-MCNC: 3.5 G/DL (ref 2.8–4.4)
GLUCOSE BLD-MCNC: 126 MG/DL (ref 70–99)
HBA1C MFR BLD: 5.9 % (ref ?–5.7)
HCT VFR BLD AUTO: 36.5 %
HDLC SERPL-MCNC: 48 MG/DL (ref 40–59)
HGB BLD-MCNC: 12.1 G/DL
IGA SERPL-MCNC: 124 MG/DL (ref 70–312)
IMM GRANULOCYTES # BLD AUTO: 0.01 X10(3) UL (ref 0–1)
IMM GRANULOCYTES NFR BLD: 0.2 %
LDLC SERPL CALC-MCNC: 40 MG/DL (ref ?–100)
LYMPHOCYTES # BLD AUTO: 1.73 X10(3) UL (ref 1–4)
LYMPHOCYTES NFR BLD AUTO: 33.6 %
MCH RBC QN AUTO: 29.2 PG (ref 26–34)
MCHC RBC AUTO-ENTMCNC: 33.2 G/DL (ref 31–37)
MCV RBC AUTO: 88 FL
MONOCYTES # BLD AUTO: 0.39 X10(3) UL (ref 0.1–1)
MONOCYTES NFR BLD AUTO: 7.6 %
NEUTROPHILS # BLD AUTO: 2.92 X10 (3) UL (ref 1.5–7.7)
NEUTROPHILS # BLD AUTO: 2.92 X10(3) UL (ref 1.5–7.7)
NEUTROPHILS NFR BLD AUTO: 56.6 %
NONHDLC SERPL-MCNC: 54 MG/DL (ref ?–130)
OSMOLALITY SERPL CALC.SUM OF ELEC: 286 MOSM/KG (ref 275–295)
PLATELET # BLD AUTO: 200 10(3)UL (ref 150–450)
POTASSIUM SERPL-SCNC: 4.1 MMOL/L (ref 3.5–5.1)
PROT SERPL-MCNC: 7.3 G/DL (ref 6.4–8.2)
RBC # BLD AUTO: 4.15 X10(6)UL
SODIUM SERPL-SCNC: 138 MMOL/L (ref 136–145)
TRIGL SERPL-MCNC: 66 MG/DL (ref 30–149)
TSI SER-ACNC: 1.06 MIU/ML (ref 0.36–3.74)
VLDLC SERPL CALC-MCNC: 9 MG/DL (ref 0–30)
WBC # BLD AUTO: 5.2 X10(3) UL (ref 4–11)

## 2023-02-08 PROCEDURE — 77067 SCR MAMMO BI INCL CAD: CPT | Performed by: FAMILY MEDICINE

## 2023-02-08 PROCEDURE — 86364 TISS TRNSGLTMNASE EA IG CLAS: CPT

## 2023-02-08 PROCEDURE — 85025 COMPLETE CBC W/AUTO DIFF WBC: CPT

## 2023-02-08 PROCEDURE — 83036 HEMOGLOBIN GLYCOSYLATED A1C: CPT

## 2023-02-08 PROCEDURE — 77063 BREAST TOMOSYNTHESIS BI: CPT | Performed by: FAMILY MEDICINE

## 2023-02-08 PROCEDURE — 80053 COMPREHEN METABOLIC PANEL: CPT

## 2023-02-08 PROCEDURE — 84443 ASSAY THYROID STIM HORMONE: CPT

## 2023-02-08 PROCEDURE — 36415 COLL VENOUS BLD VENIPUNCTURE: CPT

## 2023-02-08 PROCEDURE — 80061 LIPID PANEL: CPT

## 2023-02-09 LAB — TTG IGA SER-ACNC: 0.3 U/ML (ref ?–7)

## 2023-02-20 ENCOUNTER — TELEPHONE (OUTPATIENT)
Dept: INTERNAL MEDICINE CLINIC | Facility: CLINIC | Age: 38
End: 2023-02-20

## 2023-02-20 NOTE — TELEPHONE ENCOUNTER
Tacey Goodpasture from [facility] called to get permission to change patient to capsules of phentermine 37.5 mg since tabs are on long term back order. Okay given.

## 2023-05-03 ENCOUNTER — OFFICE VISIT (OUTPATIENT)
Dept: INTERNAL MEDICINE CLINIC | Facility: CLINIC | Age: 38
End: 2023-05-03
Payer: COMMERCIAL

## 2023-05-03 ENCOUNTER — PATIENT MESSAGE (OUTPATIENT)
Dept: INTERNAL MEDICINE CLINIC | Facility: CLINIC | Age: 38
End: 2023-05-03

## 2023-05-03 VITALS
OXYGEN SATURATION: 99 % | DIASTOLIC BLOOD PRESSURE: 84 MMHG | HEIGHT: 67 IN | RESPIRATION RATE: 18 BRPM | HEART RATE: 90 BPM | SYSTOLIC BLOOD PRESSURE: 124 MMHG | WEIGHT: 240 LBS | BODY MASS INDEX: 37.67 KG/M2

## 2023-05-03 DIAGNOSIS — E66.9 OBESITY WITH BODY MASS INDEX (BMI) OF 35.0 TO 39.9 WITHOUT COMORBIDITY: ICD-10-CM

## 2023-05-03 DIAGNOSIS — Z51.81 THERAPEUTIC DRUG MONITORING: Primary | ICD-10-CM

## 2023-05-03 DIAGNOSIS — E78.6 LOW HDL (UNDER 40): ICD-10-CM

## 2023-05-03 DIAGNOSIS — F43.9 STRESS: ICD-10-CM

## 2023-05-03 DIAGNOSIS — R73.03 PREDIABETES: ICD-10-CM

## 2023-05-03 PROCEDURE — 3079F DIAST BP 80-89 MM HG: CPT | Performed by: NURSE PRACTITIONER

## 2023-05-03 PROCEDURE — 3074F SYST BP LT 130 MM HG: CPT | Performed by: NURSE PRACTITIONER

## 2023-05-03 PROCEDURE — 99214 OFFICE O/P EST MOD 30 MIN: CPT | Performed by: NURSE PRACTITIONER

## 2023-05-03 PROCEDURE — 3008F BODY MASS INDEX DOCD: CPT | Performed by: NURSE PRACTITIONER

## 2023-05-03 RX ORDER — PHENTERMINE HYDROCHLORIDE 37.5 MG/1
37.5 TABLET ORAL
Qty: 30 TABLET | Refills: 1 | Status: SHIPPED | OUTPATIENT
Start: 2023-05-03

## 2023-05-03 NOTE — PATIENT INSTRUCTIONS
Next steps:  1. Fill your prescribed medication and take as discussed and prescribed: phentermine  Will trial wegovy 0.25mg weekly X 4 weeks (sample, demo given and 0.5mg sent to pharm)    2. Schedule a personal nutrition consultation with one of our registered dieticians       1. Drink water with meals and throughout the day, cut down on soda and/or juice if consumed. Consider flavored water options like Bubbly, Spindrift, Hint and India. 2.  Eat breakfast daily and focus on having protein with each meal, examples include: greek yogurt, cottage cheese, hard boiled egg, whole grain toast with peanut butter. 3.  Reduce refined carbohydrates and sugars which includes items such as sweets, as well as rice, pasta, and bread and make sure to choose whole grain options when having them with just 1 serving per meal about the size of your inner palm. 4.  Consume non starchy veggies daily working towards making them a good 50% of your daily food intake. Add them to lunch and dinner consistently. 5.  Start a daily probiotic: VSL#3 is recommended, (order on line at www.vsl3. com). Take 1 capsule daily with water for 30 days, then reduce to 1 every other day (this will reduce the cost). Capsules can be left out for 2 weeks, but then must be refrigerated. Please download teresa My Fitness Juanell Coad! Or Net Diary to monitor daily dietary intake and you will be able to see if you are eating the right amount of calories or too much or too little which would hinder weight loss. Additionally this will help to see your daily carbohydrate and protein intake. When you set the teresa up choose 1-2 lbs/week as a goal.  Keeping a paper food journal is an option as well to remain accountable for your choices- this is the start to mindful eating! A low calorie diet has been consistently shown to support weight loss. Continue or start exercising to help establish a routine.  If not already exercising begin with 1 day and progress as able with long-term goal of 30 minutes 5 days a week at a minimum. Meditation daily can help manage and control stress. Chronic stress can make weight loss difficult. Exercising is one way to help with stress, but meditation using the CALM Mendel or another comparable alternative can be done in your home or place of work with little time commitment. This Mendel can also help work on behavior change and improve sleep. Check out the segment under Calm Masterclass and listen to The 4 Pillars of Health. A great way to begin learning about the foundation of lifestyle with practical tips to use in your every day. Check out www.yourweightmatters. org blog for continued daily support and education along this weight loss journey! Patient Resources:     Personal Training/Fitness Classes/Health Coaching     [facility] [place] @ [email or web address]/ Full fitness center with group fitness and personal training. Discount available as client of [facility]. Health Coaching and Personal Training with Nalini Ramírez at our [employer]- individual weekly coaching with option to add personal training and small group fitness classes targeted at weight loss- [phone] and/or email @ [email or web address] ([employer]). org  360FIT [place] [email or web address] Group Fitness [phone] and/or email Cora Barry at [email or web address]  [facility] with multiple locations: Aetna (www."Alteryx, Inc."), Eat The citizenmade Fitness (www.One2start. EqualEyes), Fit Body Bootcamp (www.[place] Mega Centerbodybootcamp.EqualEyes), CellNovo (www.Nakina Systems. EqualEyes), The Exercise  (www.exercisecoach.EqualEyes)     Online Fitness  Fitness  on [employer] in 10 DVD series- www. fxnfl08QVM. EqualEyes  Sit and Be Fit - Chair exercise series Www.sitandbefit. org  Hip Hop Fit with Pino Moreno at www.hiphopfit. net     Apps for on the Go Fitness  Notify Technology 7 Minute Workout (orange box with white 7) - free on the go HIIT training mendel  Peloton Mendel @ www. Xyo     Nutrition Trackers and Tools  LoseIT! And My Fitness Pal apps and on line for tracking nutrition  NOOM - virtual health coaching  FitFoundation (healthy meals on the go) in [facility] @ www. fraqkikcwnrfw2w. Balbir Brooks MD @ www.Maxcyted.com and Tha Cerna (keto and low carb plans recommended) @ www. ZPREJG55.Redtree PeopleJ, Metabolic Meals @ www. FrontleafMetabolicMeals. com - individual prepared meals to go  VelociData, ServiceBench, [employer], Every Plate, Prestigos- on line meal delivery programs for preparation at home  [facility] in [place] for homemade meals to go @ wwwUnioncy. uControl  Diet Doctor @ www. dietdoctor. uControl - low carb swaps  Shiftgig - meal prep and planning mendel (www.yummly. com)     Stress Management/Behavior/Mindful Eating  CALM meditation mendel (www.Proxama)  Headspace  Am I Hungry? Mindful eating virtual  mendel  Www.yourweightmatters. org - Obesity Action Coalition sponsored Blog posts daily  Motivation mendel (black box with white \")- daily supportive messages sent to your phone     Books/Video Education/Podcasts  Mindless Eating by Evita Mckee  Why We Get Sick by Tripp Velez (a book about insulin resistance)  Atomic Habits by Adriana Robison (a book about taking small steps to promote greater behavior change)   Can't Hurt Me by Shira Vazquez (a book exploring the power of discipline in achieving your goals)  The End of Dieting: How to Live for Life by Dr. Maria Isabel Brandon M.D. or listen to The [facility] Episode 61: Understanding \"Nutritarian\" Eating w/Dr. Maria Isabel Brandon  Your Body in Balance: The [employer] of Food, Hormones, and Health by Dr. Payton Velez  The Menopause Diet Plan by Juan M Solitario and [facility] AT [facility]  The Complete Guide to fasting by Dr. Soha Miller, [facility] by Nikolay Haney, Ph.D, R.D.   Weight Loss Surgery Will Not Treat Food Addiction by Jeffrey Porter Ph.D  The Tete Tena on plant based nutrition  Fed Up - documentary about obesity (Free on New Coco Communicationswn)  The Truth About Sugar - documentary on sugar (Free on Utube, https://youtu. be/7U7wtsnRT4l)  The Dr. Linda Montero by Dr. Brielle Fabian MD  Fitlosophy Fitspiration - journal to better health (found at [employer] in fitness aisle)  What Happened to You?- a look at the impact trauma has on behavior written by Darryn England and Dr. Brooklyn Martinez Again by Mishel Koehler - discovering your true self after trauma  Vickie Coello talk on GoSpotCheck, The Call to Courage  Podcasts: The Exam Room by the Physician's Committee, Nutrition Facts by Dr. Jyotsna Colvin    We are here to support you with weight loss, but please remember that you still need your primary care provider for your routine health maintenance.

## 2023-06-06 ENCOUNTER — TELEPHONE (OUTPATIENT)
Dept: INTERNAL MEDICINE CLINIC | Facility: CLINIC | Age: 38
End: 2023-06-06

## 2023-06-06 NOTE — TELEPHONE ENCOUNTER
PA is needed  Will setup to be initiated in [address] [place] [address]    [id number]  [id number]     Therapeutic drug monitoring Z51.81     Obesity with body mass index (BMI) of 35.0 to 39.9 without comorbidity E66.9     Prediabetes R73.03     Low HDL (under 40) E78.6     Stress F43.9      05/03/23 : 240 lb (108.9 kg)    [facility]

## 2023-12-20 ENCOUNTER — OFFICE VISIT (OUTPATIENT)
Dept: AUDIOLOGY | Facility: CLINIC | Age: 38
End: 2023-12-20

## 2023-12-20 ENCOUNTER — OFFICE VISIT (OUTPATIENT)
Dept: OTOLARYNGOLOGY | Facility: CLINIC | Age: 38
End: 2023-12-20

## 2023-12-20 DIAGNOSIS — H90.12 CONDUCTIVE HEARING LOSS OF LEFT EAR WITH UNRESTRICTED HEARING OF RIGHT EAR: ICD-10-CM

## 2023-12-20 DIAGNOSIS — H65.22 LEFT CHRONIC SEROUS OTITIS MEDIA: Primary | ICD-10-CM

## 2023-12-20 DIAGNOSIS — H90.12 CONDUCTIVE HEARING LOSS OF LEFT EAR WITH UNRESTRICTED HEARING OF RIGHT EAR: Primary | ICD-10-CM

## 2023-12-20 DIAGNOSIS — H69.92 ACUTE DYSFUNCTION OF EUSTACHIAN TUBE, LEFT: ICD-10-CM

## 2023-12-20 PROCEDURE — 31231 NASAL ENDOSCOPY DX: CPT | Performed by: OTOLARYNGOLOGY

## 2023-12-20 PROCEDURE — 99203 OFFICE O/P NEW LOW 30 MIN: CPT | Performed by: OTOLARYNGOLOGY

## 2023-12-20 PROCEDURE — 92567 TYMPANOMETRY: CPT | Performed by: AUDIOLOGIST

## 2023-12-20 PROCEDURE — 92557 COMPREHENSIVE HEARING TEST: CPT | Performed by: AUDIOLOGIST

## 2023-12-20 NOTE — PROGRESS NOTES
NEW PATIENT PROGRESS NOTE  OTOLOGY/OTOLARYNGOLOGY    REF MD:  No referring provider defined for this encounter. PCP: David Villanueva DO    CHIEF COMPLAINT:    Chief Complaint   Patient presents with    Ear Problem     C/o ear pain and pressure   Left ear difficulty hearing X 4 weeks        HISTORY OF PRESENT ILLNESS: Bam White is a 34year old female who presents for evaluation of otalgia and ear pressure post antibiotic course. Infection began in early December, she flew to [place] with her children. Ms. Ridge Barcenas also expresses that she rode several roller coasters at this time although she was feeling some discomfort. She endorses that she was prescribed Azithromycin on 12/6/23. This was not tolerated, it caused her face to burn. She was then prescribed Cefdinir and a steroid on 12/11/23 for a 7 days course. Noted improvement. Endorsed a COVID diagnosis 2 weeks prior to [place] trip. Denied anything like this happening before. 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 30 tablet 1    tranexamic acid (LYSTEDA) 650 MG Oral Tab Take 2 tablets (1,300 mg total) by mouth every 8 (eight) hours. For up to 5 days every month with menses 90 tablet 2    cyanocobalamin 100 MCG Oral Tab Take 2.5 tablets (250 mcg total) by mouth daily.       Ferrous Gluconate 324 (38 Fe) MG Oral Tab Take 1 tablet (325 mg total) by mouth daily with breakfast.      Fluticasone Propionate 50 MCG/ACT Nasal Suspension SPRAY 1 to 2 SPRAYS INTO EACH NOSTRIL EVERY DAY        No current facility-administered medications on file prior to visit. Allergies: Allergies   Allergen Reactions    Shellfish Allergy NAUSEA AND VOMITING    Amoxicillin RASH       SOCIAL HISTORY:    Social History     Tobacco Use    Smoking status: Never    Smokeless tobacco: Never   Substance Use Topics    Alcohol use: Yes     Comment: OCCASIONALLY       FAMILY HISTORY: Denies known family history of hearing loss, tinnitus, vertigo, or migraine. Denies known family history of head and neck cancer, thyroid cancer, bleeding disorders. REVIEW OF SYSTEMS:   Positives are in bold  Neuro: Headache, facial weakness, facial numbness, neck pain, vertigo  ENT: Hearing change, tinnitus, otorrhea, otalgia, aural fullness, ear pressure, vertigo, imbalance  Sinus pressure, rhinorrhea, congestion, facial pain, jaw pain, dysphagia, odynophagia, sore throat, voice changes, shortness of breath    EXAMINATION:  I washed my hands with an alcohol-based hand gel prior to examination  Constitutional:   --Vitals: Last menstrual period 05/03/2023, not currently breastfeeding. --General: no apparent distress, well-developed, conversant  Psych: affect pleasant and appropriate for age, alert and oriented  Neuro: Facial movement normal bilateral  Eyes: Pupils equal, symmetric and reactive to light. Extra-ocular muscles intact  Respiratory: No stridor, stertor or increased work of breathing  ENT:  --OC/OP: No trismus. No masses or lesions noted over the gingiva, buccal mucosa, tongue, FOM, hard/soft palate, tonsillar pillars, posterior pharyngeal wall. Tonsils are 1+ and soft. FOM/BOT are soft. --Ear: The bilateral ears were examined under binocular microscopy  Right ear microscopic exam:  Pinna: Normal, no lesions or masses. Mastoid: Nontender on palpation. External auditory canal: Clear, no masses or lesions. Tympanic membrane: Intact, no lesions, normal landmarks. Middle ear: Aerated. Left ear microscopic exam:  Pinna: Normal, no lesions or masses.   Mastoid: Nontender on palpation. External auditory canal: Clear, no masses or lesions. Tympanic membrane: Intact, no lesions, normal landmarks. Middle ear: Serous otitis media. Nasal endoscopy (date 12/20/23)  Verbal consent obtained, patient was correctly identified  Topical anesthesia with aerosolized lidocaine and neosynepherine spray in the bilateral nares  Findings:   Right: No mucous throughout. Normal mucosa. Inferior turbinate is non-hypertrophic. Middle meatus is without polyps, purulence, masses. Middle turbinate is well formed and normal appearing. Sphenoethmoid recess is without polyps, purulence, masses. Left: No mucous throughout. Normal mucosa. Inferior turbinate is non-hypertrophic. Middle meatus is without polyps, purulence, masses. Middle turbinate is well formed and normal appearing. Sphenoethmoid recess is without polyps, purulence, masses. Septum: largely midline  Nasopharynx: No masses, bilateral eustachian tubes are patent. The bilateral fossae of Rosenmueller are clear. Latest Audiogram Result (Hz) Exam performed: 12/20/2023 2:47 PM Last edited by Kell Fleming on 12/20/2023 2:52 PM        125 250  1500 2000 3000 4000 6000 8000    Right air:  10 15  15  5  5  5    Left air:  15 20  30  30  25 35 45    Left air (masked):           45    Left mastoid bone (masked):   5  5  20  10         Reliability:  Fair    Transducer:   Inserts    Technique:  Conventional Audiometry    Comments:            Latest Speech Audiometry  Last edited by Kell Fleming on 12/20/2023 2:52 PM       Ear Method PTA SAT SRT [facility] Test/list Score (%) Intensity Mask/noise Notes    right live voice   10   10 By Difficulty 100 50      left live voice   25   10 By Difficulty 100 60  masked                  Latest Tympanogram Result       Probe Tone (Hz): 226 Exam performed: 12/20/2023 2:48 PM Last edited by Kell Fleming on 12/20/2023 2:52 PM      Tympanograms  These were drawn by a user, not generated from device data      Right Ear Left Ear                     Right Ear Left Ear    Tympanogram type: Type A Type B    Canal volume (mL): 1.4 1.1    Peak pressure (daPa): -7     Peak amplitude (mL): 0.77     Tympanogram width (daPa): Comments:                    Latest Audiogram and Tympanogram Result Text  Last edited by Kell Ha on 12/20/2023  2:59 PM      Addendum      Otoscopic Inspection:  both ears: no cerumen and TM visualized    Summary  Right: WNL  Left Mild CHL    Flat tympanogram for the left ear     Follow up with Troy Broderick M.D..  Audiological monitoring as needed during the course of medical management. Addended by Kell Ha on 12/20/2023  2:59 PM                ASSESSMENT/PLAN:  My [place] is a 34year old female with     ICD-10-CM   1. Left chronic serous otitis media  H65.22   2. Acute dysfunction of Eustachian tube, left  H69.92   3. Conductive hearing loss of left ear with unrestricted hearing of right ear  H90.12        IMPRESSION:  Left chronic serous otitis media  Left acute eustachian tube dysfunction  Left conductive hearing loss  Prior sinus infection appears resolved    PLAN:  -Audiogram reviewed with patient  -Patient has already completed a 7 day antibiotic and steroid course  -Start flonase nasal spray, 2 sprays daily to each nostril, may take up to 6 weeks weeks of consistent use to take effect. Proper application discussed.    -Follow up in 6 weeks if fluid has not resolved to consider further intervention    Situation reviewed with the patient in detail. Attention: This note has been scribed by Beverlyn Gowers under the supervision of Troy Broderick MD.    Troy Broderick MD  Otology/Otolaryngology  [facility]   [address] [address] [facility]  [place]  Phone [phone]  Fax [phone]      I have personally performed the services described in this documentation.  All medical record entries made by the scribe were at my direction and in my presence. I have reviewed the chart and agree that the medical record reflects my personal performance and is accurate and complete.

## 2024-01-09 ENCOUNTER — ORDER TRANSCRIPTION (OUTPATIENT)
Dept: ADMINISTRATIVE | Facility: HOSPITAL | Age: 39
End: 2024-01-09

## 2024-01-09 ENCOUNTER — PATIENT MESSAGE (OUTPATIENT)
Dept: FAMILY MEDICINE CLINIC | Facility: CLINIC | Age: 39
End: 2024-01-09

## 2024-01-09 DIAGNOSIS — Z12.31 ENCOUNTER FOR SCREENING MAMMOGRAM FOR MALIGNANT NEOPLASM OF BREAST: Primary | ICD-10-CM

## 2024-01-09 DIAGNOSIS — Z00.00 LABORATORY EXAM ORDERED AS PART OF ROUTINE GENERAL MEDICAL EXAMINATION: ICD-10-CM

## 2024-01-09 DIAGNOSIS — E01.0 THYROMEGALY: ICD-10-CM

## 2024-01-09 DIAGNOSIS — R73.03 PREDIABETES: ICD-10-CM

## 2024-01-09 DIAGNOSIS — E04.1 THYROID CYST: Primary | ICD-10-CM

## 2024-01-10 NOTE — TELEPHONE ENCOUNTER
From: Clau Santamaria  To: Corwin Lr  Sent: 1/9/2024 10:17 AM CST  Subject: Test    Hello,    Can you schedule my mammogram,   Thyroid ultrasound, and bloodwork a head of my annual exam. Would love to be able to discuss those results in person at my check up.    Thank you,    Clau Santamaria

## 2024-01-11 ENCOUNTER — TELEMEDICINE (OUTPATIENT)
Dept: INTERNAL MEDICINE CLINIC | Facility: CLINIC | Age: 39
End: 2024-01-11
Payer: COMMERCIAL

## 2024-01-11 DIAGNOSIS — Z51.81 THERAPEUTIC DRUG MONITORING: Primary | ICD-10-CM

## 2024-01-11 DIAGNOSIS — R73.03 PREDIABETES: ICD-10-CM

## 2024-01-11 DIAGNOSIS — E78.6 LOW HDL (UNDER 40): ICD-10-CM

## 2024-01-11 DIAGNOSIS — F43.9 STRESS: ICD-10-CM

## 2024-01-11 DIAGNOSIS — E66.9 OBESITY WITH BODY MASS INDEX (BMI) OF 35.0 TO 39.9 WITHOUT COMORBIDITY: ICD-10-CM

## 2024-01-11 PROCEDURE — 99214 OFFICE O/P EST MOD 30 MIN: CPT | Performed by: NURSE PRACTITIONER

## 2024-01-11 RX ORDER — PHENTERMINE HYDROCHLORIDE 37.5 MG/1
37.5 TABLET ORAL
Qty: 30 TABLET | Refills: 2 | Status: SHIPPED | OUTPATIENT
Start: 2024-01-11

## 2024-01-11 RX ORDER — TIRZEPATIDE 2.5 MG/.5ML
2.5 INJECTION, SOLUTION SUBCUTANEOUS WEEKLY
Qty: 2 ML | Refills: 0 | Status: SHIPPED | OUTPATIENT
Start: 2024-01-11

## 2024-01-11 NOTE — PATIENT INSTRUCTIONS
Next steps:  1.  Fill your prescribed medication and take as discussed and prescribed: phentermine 37.5mg  Zepbound 2.5mg weekly x 4   2.  Schedule a personal nutrition consultation with one of our registered dieticians     Please try to work on the following dietary changes:  Daily protein recommendation to start:  grams  Daily carbohydrate: <130g  Daily calories: 1,600-1,700  1.  Drink water with meals and throughout the day, cut down on soda and/or juice if consumed. Consider flavored water options like Bubbly, Spindrift, Hint and [place].  2.  Eat breakfast daily and focus on having protein with each meal, examples include: greek yogurt, cottage cheese, hard boiled egg, whole grain toast with peanut butter.   3.  Reduce refined carbohydrates and sugars which includes items such as sweets, as well as rice, pasta, and bread and make sure to choose whole grain options when having them with just 1 serving per meal about the size of your inner palm.  4.  Consume non starchy veggies daily working towards making them a good 50% of your daily food intake. Add them to lunch and dinner consistently.  5.  Start a daily probiotic: VSL#3 is recommended, (order on line at www.vsl3.com). Take 1 capsule daily with water for 30 days, then reduce to 1 every other day (this will reduce the cost). Capsules can be left out for 2 weeks, but then must be refrigerated.      Please download teresa My Fitness Pal, LoseIt! Or Net Diary to monitor daily dietary intake and you will be able to see if you are eating the right amount of calories or too much or too little which would hinder weight loss. Additionally this will help to see your daily carbohydrate and protein intake. When you set the teresa up choose 1-2 lbs/week as a goal.  Keeping a paper food journal is an option as well to remain accountable for your choices- this is the start to mindful eating! A low calorie diet has been consistently shown to support weight loss.      Continue or start exercising to help establish a routine. If not already exercising begin with 1 day and progress as able with long-term goal of 30 minutes 5 days a week at a minimum.     Meditation daily can help manage and control stress. Chronic stress can make weight loss difficult.  Exercising is one way to help with stress, but meditation using the CALM Mendel or another comparable alternative can be done in your home or place of work with little time commitment. This Mendel can also help work on behavior change and improve sleep. Check out the segment under Calm Masterclass and listen to The 4 Pillars of Health. A great way to begin learning about the foundation of lifestyle with practical tips to use in your every day.     Check out www.yourweightmatters.org blog for continued daily support and education along this weight loss journey!    Patient Resources:     Personal Training/Fitness Classes/Health Coaching     [facility] @ [email or web address]/healthy-[email or web address]/[email or web address]-center Full fitness center with group fitness and personal training. Discount available as client of [facility].  Health Coaching and Personal Training with Lady Paez at our [facility]- individual weekly coaching with option to add personal training and small group fitness classes targeted at weight loss- [phone] and/or email @ [email or web address]  360FIT [place] http://www.[email or web address] [email or web address]. Group Fitness [phone] and/or email Selena at [email or web address]  Franc\[facility] [place] [facility]\""ed Fitness Centers with multiple locations: SOMARK Innovations (www.CommonTime), Eat The Frog Fitness (www.GreenSand.Varthana), Fit Body Bootcamp (www.Netccmbodybootcamp.Varthana), DASAN Networks Fitness (www.Screwpulp.Varthana), The Exercise  (www.exercisecoach.Varthana)     Online Fitness  Fitness  on "MachineShop[employer] Inc"  Fit in 10 DVD series- www.gyqzt26QCB.com  Sit and Be Fit - Chair  exercise series Www.sitandbefit.org  Hip Hop Fit with Pino Moreno at www.hiphopfit.net     Apps for on the Go Fitness  [place] 7 Minute Workout (orange box with white 7) - free on the go HIIT training mendel  Peloton Mendel @ www.onepeloton.com     Nutrition Trackers and Tools  LoseIT! And My Fitness Pal apps and on line for tracking nutrition  NOOM - virtual health coaching  FitFoundation (healthy meals on the go) in [place] @ www.jwnbveorpwgrp5xSunPower Corporation  Rashad GOLDSTEIN @ wwwStyleTreadbistromd.com and Kcyfxb57 (keto and low carb plans recommended) @ wwwStyleTreadfqflxm02.com, Metabolic Meals @ www.MyMetabolicMeals.Ornim Medical - individual prepared meals to go  Gobble, Blue Apron, Home , Every Plate, Sunbasket- on line meal delivery programs for preparation at home  Meal Village in [place] for homemade meals to go @ www.mealFigleaves.comage.Ornim Medical  Diet Doctor @ www.dietdoctor.com - low carb swaps  YuOrdrIt - meal prep and planning mendel (www.yummly.com)     Stress Management/Behavior/Mindful Eating  CALM meditation mendel (www.calm.com)  Headspace  Am I Hungry? Mindful eating virtual  mendel  Www.yourweightmatters.org - Obesity Action Coalition sponsored Blog posts daily  Motivation mendel (black box with white \")- daily supportive messages sent to your phone     Books/Video Education/Podcasts  Mindless Eating by Celso Shaikh  Why We Get Sick by Harpreet Worrell (a book about insulin resistance)  Atomic Habits by Cedric Walker (a book about taking small steps to promote greater behavior change)   Can't Hurt Me by Crow Bautista (a book exploring the power of discipline in achieving your goals)  The End of Dieting: How to Live for Life by Dr. Elian Metzger M.D. or listen to The MindSnacks Podcast Episode 63: Understanding \"Nutritarian\" Eating w/Dr. Elina Metzger  Your Body in Balance: The New Science of Food, Hormones, and Health by Dr. Faustino Salinas  The Menopause Diet Plan by Marifer Guerra and Candi Hooker  The Complete Guide to fasting by Dr. Sal  Sugar, Salt &  Fat by Cate Enrique, Ph.D, R.D.  Weight Loss Surgery Will Not Treat Food Addiction by Merline Boyer Ph.D  The Game Changers- [facility] Documentary on plant based nutrition  Fed Up - documentary about obesity (Free on Utube)  The Truth About Sugar - documentary on sugar (Free on Utube, https://youtu.be/6Y0pldgQV6p)  The Dr. Hsieh T5 Wellness Plan by Dr. Joseph Hsieh MD  Fitlosophy Fitspiration - journal to better health (found at [employer] in fitness aisle)  What Happened to You?- a look at the impact trauma has on behavior written by Mari Madden and Dr. Naeem David  Whole Again by Fritz Subramanian - discovering your true self after trauma  Rolando Herrera talk on Unomy, The Call to Courage  Podcasts: The Exam Room by the Physician's Committee, Nutrition Facts by Dr. Orantes    We are here to support you with weight loss, but please remember that you still need your primary care provider for your routine health maintenance.

## 2024-01-11 NOTE — PROGRESS NOTES
[facility] WEIGHT MANAGEMENT VIRTUAL ENCOUNTER     Clau Santamaria verbally consents to a Virtual/Telephone Check-In service on 01/11/24   Patient understands and accepts financial responsibility for any deductible, co-insurance and/or co-pays associated with this service.    HISTORY OF PRESENT ILLNESS  Chief Complaint   Patient presents with    Other     F/u on weight management      Clau Santamaria is a 38 year old female is being evaluated as a video visit using Telemedicine with live, interactive video and audio    Weight gain/loss since LOV based on home monitoring:   Home scale: 244   Has gained # 4 lbs since LOV 8 months ago     Non-compliance with medication: phentermine, wegovy (wasn't able to get due to shortages)   Tolerating well, helping with decreasing appetite and no side effects     Was helping take care of  (who had an injury this past summer)  Never got back to taking care of herself  Had gotten down to #230 lbs before the start of summer  Eating out about 4 meals per week    Exercise/Activity: none  Nutrition: eating regular meals, +protein, + veggies. not tracking reports tracking reports  Stress is manageable   Sleep: 6 hours/night, waking up feeling tired    Denies chest pain, shortness of breath, dizziness, blurred vision, headache, paresthesia, nausea/vomiting.     Wt Readings from Last 6 Encounters:   05/03/23 240 lb (108.9 kg)   12/30/22 240 lb 3.2 oz (109 kg)   11/21/22 236 lb (107 kg)   10/05/22 234 lb (106.1 kg)   09/06/22 243 lb 9.6 oz (110.5 kg)   04/19/22 235 lb (106.6 kg)          Subjective  REVIEW OF SYSTEMS  GENERAL HEALTH: feels well otherwise, denied any fevers chills or night sweats   RESPIRATORY: denies shortness of breath   CARDIOVASCULAR: denies chest pain  GI: denies abdominal pain  PSYCH: denies any mood changes    Objective  EXAM  Reviewed most recent set of vitals   Physical Exam:  GENERAL: well developed, well nourished, in no apparent distress,  speaking in full sentences comfortably   SKIN: warm, pink, dry without rashes to exposed area   EYES: conjunctiva pink  HEENT: atraumatic, normocephalic  LUNGS: normal work of breathing, non labored  CARDIO: normal work, no exertion  EXTREMITIES: no cyanosis, no clubbing, no edema  NEURO: Oriented times three  PSYCH: pleasant, cooperative, normal mood and affect    Lab Results   Component Value Date    WBC 5.2 02/08/2023    RBC 4.15 02/08/2023    HGB 12.1 02/08/2023    HCT 36.5 02/08/2023    MCV 88.0 02/08/2023    MCH 29.2 02/08/2023    MCHC 33.2 02/08/2023    RDW 13.3 02/08/2023    .0 02/08/2023     Lab Results   Component Value Date     (H) 02/08/2023    BUN 9 02/08/2023    BUNCREA 12.9 07/23/2020    CREATSERUM 0.70 02/08/2023    ANIONGAP 5 02/08/2023    GFRNAA 115 09/28/2021    GFRAA 133 09/28/2021    CA 8.9 02/08/2023    OSMOCALC 286 02/08/2023    ALKPHO 25 (L) 02/08/2023    AST 11 (L) 02/08/2023    ALT 23 02/08/2023    BILT 0.4 02/08/2023    TP 7.3 02/08/2023    ALB 3.8 02/08/2023    GLOBULIN 3.5 02/08/2023     02/08/2023    K 4.1 02/08/2023     02/08/2023    CO2 24.0 02/08/2023     Lab Results   Component Value Date     02/08/2023    A1C 5.9 (H) 02/08/2023     Lab Results   Component Value Date    CHOLEST 102 02/08/2023    TRIG 66 02/08/2023    HDL 48 02/08/2023    LDL 40 02/08/2023    VLDL 9 02/08/2023    NONHDLC 54 02/08/2023     Lab Results   Component Value Date    T4F 1.0 09/28/2021    TSH 1.060 02/08/2023     No results found for: \"B12\", \"VITB12\"  No results found for: \"VITD\", \"QVITD\", \"YBKE28VO\"    Current Outpatient Medications on File Prior to Visit   Medication Sig Dispense Refill    Phentermine HCl 37.5 MG Oral Tab Take 1 tablet (37.5 mg total) by mouth before breakfast. 30 tablet 1    tranexamic acid (LYSTEDA) 650 MG Oral Tab Take 2 tablets (1,300 mg total) by mouth every 8 (eight) hours. For up to 5 days every month with menses 90 tablet 2    cyanocobalamin 100 MCG  Oral Tab Take 2.5 tablets (250 mcg total) by mouth daily.      Ferrous Gluconate 324 (38 Fe) MG Oral Tab Take 1 tablet (325 mg total) by mouth daily with breakfast.      Fluticasone Propionate 50 MCG/ACT Nasal Suspension SPRAY 1 to 2 SPRAYS INTO EACH NOSTRIL EVERY DAY        No current facility-administered medications on file prior to visit.       ASSESSMENT  Analyzed weight data:       Diagnoses and all orders for this visit:    Therapeutic drug monitoring    Obesity with body mass index (BMI) of 35.0 to 39.9 without comorbidity    Prediabetes    Low HDL (under 40)    Stress        PLAN  Continue with medications: phentermine 37.5mg  Will trial zepbound 2.5mg weekly x4 weeks and then (send in [employer] message in 3 weeks) to say either you want to stay at the same dose or increase to 5mg. Denies any personal or family history of pancreatitis, pancreatic cancer, thyroid cancer, MEN2    --advised of side effects and adverse effects of this medication  Contradictions: none, pre-glucoma ocular hypertension, per patient resolved   Reviewed labs- has labs pended from PCP  HLD  Stable, (low HDL)- encouraged exercise, follows with PCP  prediabetes, reviewed last a1c 5.9% on 2/2023   encourage physical exercise and activity  Wrote out macros and encouraged tracking  Advised to monitor blood pressure and pulse at home/ given parameters to review and contact provider.  Nutrition: low carb diet/ recommended to eat breakfast daily/ regular protein intake  Follow up with dietitian and psychologist as recommended.  Discussed the role of sleep and stress in weight management.  Counseled on comprehensive weight loss plan including attention to nutrition, exercise and behavior/stress management for success. See patient instruction below for more details.  Discussed strategies to overcome barriers to successful weight loss and weight maintenance  FITTE: ACSM recommendations (150-300 minutes/ week in active weight loss)       There are  no Patient Instructions on file for this visit.    No follow-ups on file.    Patient verbalizes understanding.    Total time spent on chart review, pre-charting, obtaining history, counseling, and educating, reviewing labs was 33 minutes.       Pt understands phone/video evaluation is not a substitute for face to face examination or emergency care. Pt advised to go to the ER or call 911 for worsening symptoms or acute distress.       Please note that the following visit was completed using two-way, real-time interactive audio and/or video communication.  This has been done in good constance to provide continuity of care in the best interest of the provider-patient relationship, due to the ongoing public health crisis/national emergency and because of restrictions of visitation.  There are limitations of this visit as no physical exam could be performed.  Every conscious effort was taken to allow for sufficient and adequate time.  This billing was spent on reviewing labs, medications, radiology tests and decision making.  Appropriate medical decision-making and tests are ordered as detailed in the plan of care above.     NOTE TO PATIENT: The 21st Century Cures Act makes clinical notes like these available to patients in the interest of transparency. Clinical notes are medical documents used by physicians and care providers to communicate with each other. These documents include medical language and terminology, abbreviations, and treatment information that may sound technical and at times possibly unfamiliar. In addition, at times, the verbiage may appear blunt or direct. These documents are one tool providers use to communicate relevant information and clinical opinions of the care providers in a way that allows common understanding of the clinical context.     Jenna Adan, APRN  1/11/2024

## 2024-01-12 ENCOUNTER — PATIENT MESSAGE (OUTPATIENT)
Dept: INTERNAL MEDICINE CLINIC | Facility: CLINIC | Age: 39
End: 2024-01-12

## 2024-01-15 ENCOUNTER — TELEPHONE (OUTPATIENT)
Dept: INTERNAL MEDICINE CLINIC | Facility: CLINIC | Age: 39
End: 2024-01-15

## 2024-01-15 NOTE — TELEPHONE ENCOUNTER
From: Clau Santamaria  To: Jenna Adan  Sent: 1/12/2024 1:26 PM CST  Subject: Zepbound    Pharmacy says I need authorization from my insurance. I know your team was able to get [employer] approved, but they were out of supply. Hopefully they can get this approved.    Thanks,    Clau

## 2024-01-15 NOTE — TELEPHONE ENCOUNTER
Patient states PA is needed for Zepbound 2.5 mg  Will try to enter in epic  Awaiting questions   Acceptable shape position of pinnae/No pits or tags

## 2024-01-23 ENCOUNTER — HOSPITAL ENCOUNTER (OUTPATIENT)
Dept: ULTRASOUND IMAGING | Age: 39
Discharge: HOME | End: 2024-01-23
Attending: FAMILY MEDICINE
Payer: COMMERCIAL

## 2024-01-23 DIAGNOSIS — E04.1 THYROID CYST: ICD-10-CM

## 2024-01-23 DIAGNOSIS — E01.0 THYROMEGALY: ICD-10-CM

## 2024-01-23 PROCEDURE — 76536 US EXAM OF HEAD AND NECK: CPT | Performed by: FAMILY MEDICINE

## 2024-01-24 NOTE — TELEPHONE ENCOUNTER
Denied in epic  Will try to apply in CMM    Reapplied in CMM    Noted she tried wegovy and got sick  Saxenda on backorder  On phentermine without benefit  Orlistat not recommended d/t GI issues        RENARD FENTON ([place]: [id number])

## 2024-02-04 NOTE — TELEPHONE ENCOUNTER
Denied again with the reasons that she must try and fail preferred - as we listed she could not and has failed some.  Must print the denial in CMM and dispute.

## 2024-02-09 ENCOUNTER — OFFICE VISIT (OUTPATIENT)
Dept: FAMILY MEDICINE CLINIC | Facility: CLINIC | Age: 39
End: 2024-02-09
Payer: COMMERCIAL

## 2024-02-09 ENCOUNTER — HOSPITAL ENCOUNTER (OUTPATIENT)
Dept: MAMMOGRAPHY | Age: 39
Discharge: HOME | End: 2024-02-09
Attending: FAMILY MEDICINE
Payer: COMMERCIAL

## 2024-02-09 VITALS
WEIGHT: 234 LBS | DIASTOLIC BLOOD PRESSURE: 76 MMHG | HEART RATE: 72 BPM | HEIGHT: 67 IN | TEMPERATURE: 98 F | BODY MASS INDEX: 36.73 KG/M2 | SYSTOLIC BLOOD PRESSURE: 110 MMHG | RESPIRATION RATE: 16 BRPM

## 2024-02-09 DIAGNOSIS — Z12.31 ENCOUNTER FOR SCREENING MAMMOGRAM FOR MALIGNANT NEOPLASM OF BREAST: ICD-10-CM

## 2024-02-09 DIAGNOSIS — N80.9 ENDOMETRIOSIS: ICD-10-CM

## 2024-02-09 DIAGNOSIS — Z00.00 WELL ADULT EXAM: Primary | ICD-10-CM

## 2024-02-09 DIAGNOSIS — E04.1 THYROID CYST: ICD-10-CM

## 2024-02-09 DIAGNOSIS — E66.9 OBESITY WITH BODY MASS INDEX (BMI) OF 35.0 TO 39.9 WITHOUT COMORBIDITY: ICD-10-CM

## 2024-02-09 DIAGNOSIS — Z80.3 FAMILY HISTORY OF BREAST CANCER: ICD-10-CM

## 2024-02-09 DIAGNOSIS — E01.0 THYROMEGALY: ICD-10-CM

## 2024-02-09 DIAGNOSIS — R73.03 PREDIABETES: ICD-10-CM

## 2024-02-09 PROCEDURE — 77063 BREAST TOMOSYNTHESIS BI: CPT | Performed by: FAMILY MEDICINE

## 2024-02-09 PROCEDURE — 77067 SCR MAMMO BI INCL CAD: CPT | Performed by: FAMILY MEDICINE

## 2024-02-09 NOTE — PROGRESS NOTES
HPI:   Clau Santamaria is a 38 year old female that presents for wellness exam.   Chief Complaint   Patient presents with    Physical     Patient is here for physical.   There is a family hx of breast cancer - grandmother got diagnosed  in her late 30s and mother with breast cancer at age late 40s. They were brca negative as far as patient knows. She has not spoken with  yet.   Declined flu shot     She is on phentermine for weight loss. May start on zepbound.   She tried wegovy but reacted poorly to it with GI effects.     Last A1c value was 5.9% done 2/8/2023.      Pt is on lysteda. She has heavy menses- sees gyn.     Thyroid nodule- marginal increase in size. will repeat in 1 year.       Wt Readings from Last 6 Encounters:   02/09/24 234 lb (106.1 kg)   05/03/23 240 lb (108.9 kg)   12/30/22 240 lb 3.2 oz (109 kg)   11/21/22 236 lb (107 kg)   10/05/22 234 lb (106.1 kg)   09/06/22 243 lb 9.6 oz (110.5 kg)     Smoking: none  Alcohol: occasionally   Drugs: none   Sexual hx: 1 partner   STD hx: declined  Occupation: sales   Colonoscopy: due at 43   Pap smear:  Utd - 2022 normal.   Diet: healthy diet  Exercise: regularly      Mammogram scheduled. She had it done today but will need additional views.         HISTORY:  Past Medical History:   Diagnosis Date    Benign thyroid cyst 5/27/2021    Endometriosis 7/23/2020    Endometriosis       Past Surgical History:   Procedure Laterality Date    REMOVAL OF OVARIAN CYST(S) Left     2014      Family History   Problem Relation Age of Onset    Diabetes Father     Cancer Father         sarcoma    Cancer Mother     Breast Cancer Mother         age 40's    Cancer Maternal Grandmother         BREAST    Breast Cancer Maternal Grandmother         age 30's    Heart Attack Maternal Grandfather     Heart Attack Paternal Grandmother       Social History     Socioeconomic History    Marital status:    Tobacco Use    Smoking status: Never    Smokeless tobacco: Never    Vaping Use    Vaping Use: Never used   Substance and Sexual Activity    Alcohol use: Yes     Comment: OCCASIONALLY    Drug use: Never   Other Topics Concern    Caffeine Concern Yes     Comment: 1 cup coffee or red bull 3x week    Exercise Yes     Comment: biking 2-3 days a week 5-15miles.            REVIEW OF SYSTEMS:       Review of Systems   Constitutional: Negative for fever, chills and fatigue. No distress.  HENT: Negative for hearing loss, congestion, sore throat, neck pain and dental problem.    Eyes: Negative for pain and visual disturbance.   Respiratory: Negative for cough, chest tightness, shortness of breath and wheezing.    Cardiovascular: Negative for chest pain, palpitations and leg swelling.   Gastrointestinal: Negative for  , vomiting, abdominal pain,  , blood in stool and abdominal distention.   Genitourinary: Negative for dysuria, hematuria and difficulty urinating.   Hematological: Negative for adenopathy. Does not bruise/bleed easily.   Psychiatric/Behavioral: The patient is not nervous/anxious. No depression.      PHYSICAL EXAM:   Resp 16   Ht 5' 7\" (1.702 m)   Wt 234 lb (106.1 kg)   LMP 01/24/2023 (Exact Date)   BMI 36.65 kg/m²  Estimated body mass index is 36.65 kg/m² as calculated from the following:    Height as of this encounter: 5' 7\" (1.702 m).    Weight as of this encounter: 234 lb (106.1 kg).   Vital signs reviewed.Appears stated age, well groomed.  Physical Exam:  GEN:  Patient is alert, awake and oriented, well developed, well nourished, no apparent distress.  HEENT:     Head:  Normocephalic, atraumatic    Eyes: EOMI, PERRLA, no scleral icterus, conjunctivae clear bilaterally   Ears: External normal. TMs normal without erythema or effusion   Nose: patent, no nasal discharge    Throat:  No tonsillar erythema or exudate.     Mouth:  No oral lesions or ulcerations, good dentition.  NECK: Supple,  mild thyromegaly.  HEART:  Regular rate and rhythm, no murmurs, rubs or  gallops.  LUNGS: Clear to auscultation bilterally, no rales/rhonchi/wheezing.  ABDOMEN:  Soft, nondistended, nontender, bowel sounds normal in all 4 quadrants, no masses, no hepatosplenomegaly.  EXTREMITIES:  No edema, no cyanosis   NEURO:  Grossly normal, gait stable     ASSESSMENT AND PLAN:      1. Family history of breast cancer  - Genetic Counselor Referral - [place] ([place])    2. Endometriosis  Patient lysteda prn for heavy menses.     3. Thyromegaly  Utd on thyroid US.   Tirads 4.   Repeat US in 1 year.     4. Obesity with body mass index (BMI) of 35.0 to 39.9 without comorbidity  Pt on phentermine will be starting zepbound     5. Well adult exam  - -Discussed diet and exercise, counseled on vaccine and screening guidelines.     6. Prediabetes  Last A1c value was 5.9% done 2/8/2023.    7. Thyroid cyst  Stable.       Risks, benefits, and alternatives of current treatment plan discussed in detail. Red flags discussed to RTC or ED . Questions and concerns addressed. Patient (or parent) agrees to plan.      No follow-ups on file.    Corwin Lr DO        This note was prepared using Dragon Medical voice recognition dictation software. As a result errors may occur. When identified these errors have been corrected. While every attempt is made to correct errors during dictation discrepancies may still exist.      Note to patient: The 21st Century Cures Act makes medical notes like these available to patients in the interest of transparency. However, be advised this is a medical document. It is intended as peer to peer communication. It is written in medical language and may contain abbreviations or verbiage that are unfamiliar. It may appear blunt or direct. Medical documents are intended to carry relevant information, facts as evident, and the clinical opinion of the practitioner.

## 2024-02-12 ENCOUNTER — PATIENT MESSAGE (OUTPATIENT)
Dept: INTERNAL MEDICINE CLINIC | Facility: CLINIC | Age: 39
End: 2024-02-12

## 2024-02-12 NOTE — TELEPHONE ENCOUNTER
From: Clau Santamaria  To: Jenna Adan  Sent: 2/12/2024 11:16 AM CST  Subject: Zepbound    Sorry my chart wouldn’t let me respond to the prior thread.    Thank you for submitting my appeal Emily. I’m waiting to hear back from them.     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 I’m down to 232 (~12lbs) today and have little to no side effects. Would your recommendation going up to the 5 or sticking with the 2.5?    Thank you,    Clau

## 2024-02-12 NOTE — TELEPHONE ENCOUNTER
Requesting Zepbound  LOV: 1/11/24  RTC: 3 months  Last Relevant Labs: na  Filled: 1/11/24 #2ml with 0 refills  2.5 mg dose    Future Appointments   Date Time Provider Department Center   2/20/2024 12:40 PM PF [facility]1 [facility]

## 2024-02-13 RX ORDER — TIRZEPATIDE 5 MG/.5ML
5 INJECTION, SOLUTION SUBCUTANEOUS WEEKLY
Qty: 2 ML | Refills: 0 | Status: SHIPPED | OUTPATIENT
Start: 2024-02-13

## 2024-02-20 ENCOUNTER — HOSPITAL ENCOUNTER (OUTPATIENT)
Dept: MAMMOGRAPHY | Age: 39
Discharge: HOME | End: 2024-02-20
Attending: FAMILY MEDICINE
Payer: COMMERCIAL

## 2024-02-20 ENCOUNTER — HOSPITAL ENCOUNTER (OUTPATIENT)
Dept: ULTRASOUND IMAGING | Age: 39
Discharge: HOME | End: 2024-02-20
Attending: FAMILY MEDICINE
Payer: COMMERCIAL

## 2024-02-20 DIAGNOSIS — R92.2 INCONCLUSIVE MAMMOGRAM: ICD-10-CM

## 2024-02-20 PROCEDURE — 77065 DX MAMMO INCL CAD UNI: CPT | Performed by: FAMILY MEDICINE

## 2024-02-20 PROCEDURE — 77061 BREAST TOMOSYNTHESIS UNI: CPT | Performed by: FAMILY MEDICINE

## 2024-02-20 PROCEDURE — 76642 ULTRASOUND BREAST LIMITED: CPT | Performed by: FAMILY MEDICINE

## 2024-02-29 NOTE — TELEPHONE ENCOUNTER
Anyway we can add \"pre-glucoma ocular hypertension\" for reason why we can't use qsymia.   If she can get wegovy now from local pharmacy- we could try that too...

## 2024-02-29 NOTE — TELEPHONE ENCOUNTER
I called [facility] and spoke to Nickie  2 prior auths done  Appeal was done and denied    So now we must do a 2nd level appeal which can be done and take 15-30 days.  She is faxing the appeal denial

## 2024-02-29 NOTE — TELEPHONE ENCOUNTER
I called [facility] to check on status of reconsideration and it was denied again.    She must try and fail Qsymia before they will consider approving Zepbound

## 2024-03-08 NOTE — TELEPHONE ENCOUNTER
Appeal denial received  Now must appeal in writing and show why you or your doctor thinks this medication needs to be covered for patient  Include a letter, test results and clinical notes  Can ask for urgent  The 2nd level appeal goes to [facility] at  (fax)    Will your write a letter?  I will attach to all information and fax?

## 2024-03-15 DIAGNOSIS — E66.9 OBESITY WITH BODY MASS INDEX (BMI) OF 35.0 TO 39.9 WITHOUT COMORBIDITY: Primary | ICD-10-CM

## 2024-03-15 RX ORDER — TIRZEPATIDE 7.5 MG/.5ML
7.5 INJECTION, SOLUTION SUBCUTANEOUS WEEKLY
Qty: 2 ML | Refills: 1 | Status: SHIPPED | OUTPATIENT
Start: 2024-03-15

## 2024-03-15 RX ORDER — TIRZEPATIDE 5 MG/.5ML
5 INJECTION, SOLUTION SUBCUTANEOUS WEEKLY
Qty: 2 ML | Refills: 0 | Status: CANCELLED | OUTPATIENT
Start: 2024-03-15

## 2024-03-15 NOTE — TELEPHONE ENCOUNTER
Requesting increase  Zepound     Requested Prescriptions     Pending Prescriptions Disp Refills    Tirzepatide-Weight Management (ZEPBOUND) 5 MG/0.5ML Subcutaneous Solution Auto-injector 2 mL 0     Sig: Inject 5 mg into the skin once a week.         LOV: 01/11/2024  RTC: in 3 months  Filled: 02/13/2024 #2ml with 0 refills    Future Appointments   Date Time Provider Department Center   4/22/2024  4:00 PM Jenna Adan APRN [facility]       Patient Comment: Still doing well, should I go up to 7.5 down 20lbs    Pt want to increase the dose. Please advise.

## 2024-03-20 ENCOUNTER — V-VISIT (OUTPATIENT)
Dept: URGENT CARE | Age: 39
End: 2024-03-20

## 2024-03-20 VITALS
RESPIRATION RATE: 16 BRPM | DIASTOLIC BLOOD PRESSURE: 80 MMHG | SYSTOLIC BLOOD PRESSURE: 137 MMHG | OXYGEN SATURATION: 98 % | HEIGHT: 67 IN | TEMPERATURE: 98.4 F | WEIGHT: 225 LBS | BODY MASS INDEX: 35.31 KG/M2 | HEART RATE: 93 BPM

## 2024-03-20 DIAGNOSIS — J02.9 ACUTE PHARYNGITIS, UNSPECIFIED ETIOLOGY: Primary | ICD-10-CM

## 2024-03-20 DIAGNOSIS — E66.9 OBESITY WITH BODY MASS INDEX (BMI) OF 35.0 TO 39.9 WITHOUT COMORBIDITY: Primary | ICD-10-CM

## 2024-03-20 LAB
INTERNAL PROCEDURAL CONTROLS ACCEPTABLE: YES
S PYO AG THROAT QL IA.RAPID: NEGATIVE
TEST LOT EXPIRATION DATE: NORMAL
TEST LOT NUMBER: NORMAL

## 2024-03-20 PROCEDURE — 87880 STREP A ASSAY W/OPTIC: CPT | Performed by: NURSE PRACTITIONER

## 2024-03-20 PROCEDURE — 99213 OFFICE O/P EST LOW 20 MIN: CPT | Performed by: NURSE PRACTITIONER

## 2024-03-20 RX ORDER — TIRZEPATIDE 7.5 MG/.5ML
7.5 INJECTION, SOLUTION SUBCUTANEOUS
COMMUNITY
Start: 2024-03-15

## 2024-03-20 RX ORDER — TIRZEPATIDE 5 MG/.5ML
5 INJECTION, SOLUTION SUBCUTANEOUS WEEKLY
Qty: 2 ML | Refills: 1 | Status: SHIPPED | OUTPATIENT
Start: 2024-03-20

## 2024-03-20 RX ORDER — TIRZEPATIDE 5 MG/.5ML
5 INJECTION, SOLUTION SUBCUTANEOUS WEEKLY
Qty: 2 ML | Refills: 0 | OUTPATIENT
Start: 2024-03-20

## 2024-03-20 ASSESSMENT — ENCOUNTER SYMPTOMS
COUGH: 1
CHEST TIGHTNESS: 0
SHORTNESS OF BREATH: 0
GASTROINTESTINAL NEGATIVE: 1
EYES NEGATIVE: 1
CONSTITUTIONAL NEGATIVE: 1
WHEEZING: 0
SORE THROAT: 1

## 2024-03-20 ASSESSMENT — PAIN SCALES - GENERAL: PAINLEVEL: 2

## 2024-03-20 NOTE — TELEPHONE ENCOUNTER
Requesting   Requested Prescriptions     Pending Prescriptions Disp Refills    ZEPBOUND 5 MG/0.5ML Subcutaneous Solution Auto-injector [Pharmacy Med Name: ZEPBOUND 5 MG/0.5 ML PEN]  0     Sig: INJECT 5 MG SUBCUTANEOUSLY WEEKLY      LOV: 01/11/2024  RTC: in about 3 months  Filled: 02/13/2024 #2ml with 0 refills    Future Appointments   Date Time Provider Department Center   4/22/2024  4:00 PM Jenna Adan APRN [facility]     Pt is paying out pocket, never  7.5mg that sent due to backorder.

## 2024-03-21 RX ORDER — TIRZEPATIDE 5 MG/.5ML
5 INJECTION, SOLUTION SUBCUTANEOUS WEEKLY
Qty: 2 ML | Refills: 0 | Status: SHIPPED | OUTPATIENT
Start: 2024-03-21

## 2024-03-21 NOTE — TELEPHONE ENCOUNTER
Patient left a voicemail she cannot find 7.5 mg zepbound and [facility] in [place] [facility] the 5 mg dose for her.  Needs order sent if we will do that.

## 2024-03-22 LAB — S PYO SPEC QL CULT: NORMAL

## 2024-04-22 ENCOUNTER — OFFICE VISIT (OUTPATIENT)
Dept: INTERNAL MEDICINE CLINIC | Facility: CLINIC | Age: 39
End: 2024-04-22
Payer: COMMERCIAL

## 2024-04-22 VITALS
HEIGHT: 67 IN | WEIGHT: 220 LBS | DIASTOLIC BLOOD PRESSURE: 78 MMHG | RESPIRATION RATE: 16 BRPM | SYSTOLIC BLOOD PRESSURE: 114 MMHG | BODY MASS INDEX: 34.53 KG/M2 | HEART RATE: 60 BPM

## 2024-04-22 DIAGNOSIS — R73.03 PREDIABETES: ICD-10-CM

## 2024-04-22 DIAGNOSIS — Z51.81 THERAPEUTIC DRUG MONITORING: Primary | ICD-10-CM

## 2024-04-22 DIAGNOSIS — E78.6 LOW HDL (UNDER 40): ICD-10-CM

## 2024-04-22 DIAGNOSIS — F43.9 STRESS: ICD-10-CM

## 2024-04-22 DIAGNOSIS — E66.9 OBESITY WITH BODY MASS INDEX (BMI) OF 35.0 TO 39.9 WITHOUT COMORBIDITY: ICD-10-CM

## 2024-04-22 PROBLEM — E55.9 VITAMIN D DEFICIENCY: Status: ACTIVE | Noted: 2018-08-23

## 2024-04-22 PROCEDURE — 3074F SYST BP LT 130 MM HG: CPT | Performed by: NURSE PRACTITIONER

## 2024-04-22 PROCEDURE — 99214 OFFICE O/P EST MOD 30 MIN: CPT | Performed by: NURSE PRACTITIONER

## 2024-04-22 PROCEDURE — 3008F BODY MASS INDEX DOCD: CPT | Performed by: NURSE PRACTITIONER

## 2024-04-22 PROCEDURE — 3078F DIAST BP <80 MM HG: CPT | Performed by: NURSE PRACTITIONER

## 2024-04-22 NOTE — PROGRESS NOTES
HISTORY OF PRESENT ILLNESS  Chief Complaint   Patient presents with    Weight Check     -24     Clau Santamaria is a 39 year old female here for follow up with medical weight loss program for the treatment of overweight, obesity, or morbid obesity.     Down 24 lbs (f/u from 1/2024 via telemedicine)   Compliant on zepbound 7.5mg weekly   Tolerating well, helping with decreasing appetite and no side effects     Feels like the higher dose of zepbound is working   Has had some constipation  Trying to do more healthier greens    Reducing eating out   Got a noticed that they wouldn't cover zepbound (since she needed to try other medication- Ie. Qsymia, phentermine and saxenda, wegovy) so she has been paying out of pocket   Exercise/Activity: 2x/ week, via walking, not doing anything routine as far as exercise  Nutrition: eating regular meals, +protein, minimal veggies. not tracking reports  Meals out per week on average: 2  Stress is manageable   Sleep: 6-7 hours/night, waking up feeling rested    Denies chest pain, shortness of breath, dizziness, blurred vision, headache, paresthesia, nausea/vomiting.     Breakfast Lunch Dinner Snacks Fluids   Reviewed              Wt Readings from Last 6 Encounters:   04/22/24 220 lb (99.8 kg)   02/09/24 234 lb (106.1 kg)   05/03/23 240 lb (108.9 kg)   12/30/22 240 lb 3.2 oz (109 kg)   11/21/22 236 lb (107 kg)   10/05/22 234 lb (106.1 kg)          REVIEW OF SYSTEMS  GENERAL: feels well otherwise, denied any fevers chills or night sweats   LUNGS: denies shortness of breath  CARDIOVASCULAR: denies chest pain  GI: denies abdominal pain  MUSCULOSKELETAL: denies back pain, joint pains   PSYCH: denies change in behavior or mood, denies feeling sad or depressed    EXAM  /78   Pulse 60   Resp 16   Ht 5' 7\" (1.702 m)   Wt 220 lb (99.8 kg)   LMP 04/11/2023 (Exact Date)   BMI 34.46 kg/m²       GENERAL: well developed, well nourished, in no apparent distress, A/O x3  SKIN: no  rashes, no suspicious lesions  HEENT: atraumatic, normocephalic, OP-clear, PERRLA  NECK: supple, no adenopathy  LUNGS: CTA in all fields, breathing non labored  CARDIO: RRR without murmur  GI: +BS, NT/ND, no masses or HSM  EXTREMITIES: no cyanosis, no clubbing, no edema    Lab Results   Component Value Date     (H) 02/08/2023    BUN 9 02/08/2023    BUNCREA 12.9 07/23/2020    CREATSERUM 0.70 02/08/2023    ANIONGAP 5 02/08/2023    GFRNAA 115 09/28/2021    GFRAA 133 09/28/2021    CA 8.9 02/08/2023    OSMOCALC 286 02/08/2023    ALKPHO 25 (L) 02/08/2023    AST 11 (L) 02/08/2023    ALT 23 02/08/2023    BILT 0.4 02/08/2023    TP 7.3 02/08/2023    ALB 3.8 02/08/2023    GLOBULIN 3.5 02/08/2023     02/08/2023    K 4.1 02/08/2023     02/08/2023    CO2 24.0 02/08/2023     Lab Results   Component Value Date     02/08/2023    A1C 5.9 (H) 02/08/2023     Lab Results   Component Value Date    CHOLEST 102 02/08/2023    TRIG 66 02/08/2023    HDL 48 02/08/2023    LDL 40 02/08/2023    VLDL 9 02/08/2023    NONHDLC 54 02/08/2023     No results found for: \"B12\", \"VITB12\"  No results found for: \"VITD\", \"QVITD\", \"ILBL16OF\"    Current Outpatient Medications on File Prior to Visit   Medication Sig Dispense Refill    Tirzepatide-Weight Management (ZEPBOUND) 7.5 MG/0.5ML Subcutaneous Solution Auto-injector Inject 7.5 mg into the skin once a week. 2 mL 1    tranexamic acid (LYSTEDA) 650 MG Oral Tab Take 2 tablets (1,300 mg total) by mouth every 8 (eight) hours. For up to 5 days every month with menses 90 tablet 2    cyanocobalamin 100 MCG Oral Tab Take 2.5 tablets (250 mcg total) by mouth daily.      Ferrous Gluconate 324 (38 Fe) MG Oral Tab Take 1 tablet (325 mg total) by mouth daily with breakfast.      Fluticasone Propionate 50 MCG/ACT Nasal Suspension SPRAY 1 to 2 SPRAYS INTO EACH NOSTRIL EVERY DAY       Phentermine HCl 37.5 MG Oral Tab Take 1 tablet (37.5 mg total) by mouth every morning before breakfast. (Patient  not taking: Reported on 4/22/2024) 30 tablet 2     No current facility-administered medications on file prior to visit.       ASSESSMENT/PLAN    ICD-10-CM    1. Therapeutic drug monitoring  Z51.81       2. Obesity with body mass index (BMI) of 35.0 to 39.9 without comorbidity  E66.9       3. Low HDL (under 40)  E78.6       4. Stress  F43.9       5. Prediabetes  R73.03           PLAN   Initial Weight Data and Goal Weight Loss:  Initial consult: #244 lbs on 6/2021  Weight Calculations  Initial Weight: 244 lbs  Initial Weight Date: 06/01/21  Today's Weight: 220 lbs  5% Goal: 12.2  10% Goal: 24.4  Total Weight Loss: 24 lbs  Total weight loss: Down 24 lbs total, Net loss 24 lbs  Continue with medications: zepbound 7.5mg weekly  (insurance it not paying and she is paying $550 per month)  Wegovy is covered by insurance, but had side effects with sample   --advised of side effects and adverse effects of this medication  Contradictions: none, pre-glucoma ocular hypertension, per patient resolved, stopped due phentermine due to side effects    Reviewed labs- has labs pended from PCP  HLD  Stable, (low HDL)- encouraged exercise, follows with PCP  prediabetes, reviewed last a1c 5.9% on 2/2023   encourage physical exercise and activity  Wrote out macros and encouraged tracking  Nutrition: Low carb diet, recommended to eat breakfast daily/ regular protein intake  Follow up with dietitian and psychologist as recommended.  Discussed the role of sleep and stress in weight management.  Counseled on comprehensive weight loss plan including attention to nutrition, exercise and behavior/stress management for success. See patient instruction below for more details.  Discussed strategies to overcome barriers to successful weight loss and weight maintenance  FITTE: ACSM recommendations (150-300 minutes/ week in active weight loss)   Weight Loss Consent to treat reviewed and signed.    Total time spent on chart review, pre-charting,  obtaining history, counseling, and educating, reviewing labs was 30 minutes.       NOTE TO PATIENT: The 21st Century Cures Act makes clinical notes like these available to patients in the interest of transparency. Clinical notes are medical documents used by physicians and care providers to communicate with each other. These documents include medical language and terminology, abbreviations, and treatment information that may sound technical and at times possibly unfamiliar. In addition, at times, the verbiage may appear blunt or direct. These documents are one tool providers use to communicate relevant information and clinical opinions of the care providers in a way that allows common understanding of the clinical context.     There are no Patient Instructions on file for this visit.    No follow-ups on file.    Patient verbalizes understanding.    Jenna Adan, APRN

## 2024-04-23 NOTE — PATIENT INSTRUCTIONS
Next steps:  1.  Fill your prescribed medication and take as discussed and prescribed: zepbound 7.5mg weekly   2.  Schedule a personal nutrition consultation with one of our registered dieticians     Please try to work on the following dietary changes:  Daily protein recommendation to start:  grams  Daily carbohydrate: <130g  Daily calories: 1,500-1,600  1.  Drink water with meals and throughout the day, cut down on soda and/or juice if consumed. Consider flavored water options like Bubbly, Spindrift, Hint and India.  2.  Eat breakfast daily and focus on having protein with each meal, examples include: greek yogurt, cottage cheese, hard boiled egg, whole grain toast with peanut butter.   3.  Reduce refined carbohydrates and sugars which includes items such as sweets, as well as rice, pasta, and bread and make sure to choose whole grain options when having them with just 1 serving per meal about the size of your inner palm.  4.  Consume non starchy veggies daily working towards making them a good 50% of your daily food intake. Add them to lunch and dinner consistently.  5.  Start a daily probiotic: VSL#3 is recommended, (order on line at www.vsl3.com). Take 1 capsule daily with water for 30 days, then reduce to 1 every other day (this will reduce the cost). Capsules can be left out for 2 weeks, but then must be refrigerated.      Please download teresa My Fitness Pal, LoseIt! Or Net Diary to monitor daily dietary intake and you will be able to see if you are eating the right amount of calories or too much or too little which would hinder weight loss. Additionally this will help to see your daily carbohydrate and protein intake. When you set the teresa up choose 1-2 lbs/week as a goal.  Keeping a paper food journal is an option as well to remain accountable for your choices- this is the start to mindful eating! A low calorie diet has been consistently shown to support weight loss.     Continue or start exercising to  help establish a routine. If not already exercising begin with 1 day and progress as able with long-term goal of 30 minutes 5 days a week at a minimum.     Meditation daily can help manage and control stress. Chronic stress can make weight loss difficult.  Exercising is one way to help with stress, but meditation using the CALM Mendel or another comparable alternative can be done in your home or place of work with little time commitment. This Mendel can also help work on behavior change and improve sleep. Check out the segment under Calm Masterclass and listen to The 4 Pillars of Health. A great way to begin learning about the foundation of lifestyle with practical tips to use in your every day.     Check out www.yourweightmatters.org blog for continued daily support and education along this weight loss journey!    Patient Resources:     Personal Training/Fitness Classes/Health Coaching     [facility] @ [email or web address] Full fitness center with group fitness and personal training. Discount available as client of [facility].  Health Coaching and Personal Training with Lady Paez at our [facility]- individual weekly coaching with option to add personal training and small group fitness classes targeted at weight loss- [phone] and/or email @ [email or web address]  [employer] [place] [email or web address].[email or web address]. Group Fitness [phone] and/or email Selena at [email or web address]  [facility] Fitness Centers with multiple locations: nlyte Software (www.InstaJob), Eat The StarMaker Interactive Fitness (www.Arrien Pharmaceuticals.GOWEX), Fit Body Bootcamp (www."Awesome Media, LLC"bodyboMetafor Softwarep.GOWEX), UV Flu Technologies (www.Empire Robotics.GOWEX), The Exercise  (www.exercisecoach.GOWEX)     Online Fitness  Fitness  on Utube  Fit in 10 DVD series- www.icumn96QNU.com  Sit and Be Fit - Chair exercise series Www.sitandbefit.org  Hip  Hop Fit with Pino Moreno at www.hiphopfit.net     Apps for on the Go Fitness  Lacey 7 Minute Workout (orange box with white 7) - free on the go HIIT training mendel  Peloton Mendel @ www.onepeloton.com     Nutrition Trackers and Tools  LoseIT! And My Fitness Pal apps and on line for tracking nutrition  NOOM - virtual health coaching  FitFoundation (healthy meals on the go) in [place] @ www.cuivwbwpiycwv4z.SuperMama  Bistro MD @ Trunkbow.bistromd.com and Juhnru09 (keto and low carb plans recommended) @ www.nbnqci82.com, Metabolic Meals @ www.MyMetabolicMeals.com - individual prepared meals to go  Gobble, Blue Apron, Home , Every Plate, Sunbasket- on line meal delivery programs for preparation at home  Meal Village in [place] for homemade meals to go @ www.mealvillage.SuperMama  Diet Doctor @ www.dietdoctor.SuperMama - low carb swaps  YuReal Food Works - meal prep and planning mendel (www.yummly.com)     Stress Management/Behavior/Mindful Eating  CALM meditation mendel (www.calm.com)  Headspace  Am I Hungry? Mindful eating virtual  mendel  Www.yourweightmatters.org - Obesity Action Coalition sponsored Blog posts daily  Motivation mendel (black box with white \")- daily supportive messages sent to your phone     Books/Video Education/Podcasts  Mindless Eating by Celso Shaikh  Why We Get Sick by Harpreet Worrell (a book about insulin resistance)  Atomic Habits by Cedric Walker (a book about taking small steps to promote greater behavior change)   Can't Hurt Me by Crow Bautista (a book exploring the power of discipline in achieving your goals)  The End of Dieting: How to Live for Life by Dr. Elian Metzger M.D. or listen to The ApptheGame Academy Podcast Episode 63: Understanding \"Nutritarian\" Eating w/Dr. Elian Metzger  Your Body in Balance: The New Science of Food, Hormones, and Health by Dr. Faustino Salinas  The Menopause Diet Plan by Marifer Guerra and Candi Hooker  The Complete Guide to fasting by Dr. Sal  Sugar, Salt & Fat by Cate Enrique, Ph.D, R.D.  Weight  Loss Surgery Will Not Treat Food Addiction by Merline Boyer Ph.D  The Game Changers- tibditix Documentary on plant based nutrition  Fed Up - documentary about obesity (Free on Utube)  The Truth About Sugar - documentary on sugar (Free on Utube, https://youConvrrtu.be/4W8qplaAR7f)  The Dr. Hsieh T5 Wellness Plan by Dr. Joseph Hsieh MD  Fitlosophy Fitspiration - journal to better health (found at [employer] in fitness aisle)  What Happened to You?- a look at the impact trauma has on behavior written by Mari Madden and Dr. Naeem David  Whole Again by Fritz Subramanian - discovering your true self after trauma  Rolando Herrera talk on Movea, The Call to Courage  Podcasts: The Exam Room by the Physician's Committee, Nutrition Facts by Dr. Orantes    We are here to support you with weight loss, but please remember that you still need your primary care provider for your routine health maintenance.

## 2024-05-09 ENCOUNTER — PATIENT MESSAGE (OUTPATIENT)
Dept: INTERNAL MEDICINE CLINIC | Facility: CLINIC | Age: 39
End: 2024-05-09

## 2024-05-09 DIAGNOSIS — E66.9 OBESITY WITH BODY MASS INDEX (BMI) OF 35.0 TO 39.9 WITHOUT COMORBIDITY: Primary | ICD-10-CM

## 2024-05-09 DIAGNOSIS — E66.9 OBESITY WITH BODY MASS INDEX (BMI) OF 35.0 TO 39.9 WITHOUT COMORBIDITY: ICD-10-CM

## 2024-05-09 RX ORDER — TIRZEPATIDE 7.5 MG/.5ML
7.5 INJECTION, SOLUTION SUBCUTANEOUS WEEKLY
Qty: 2 ML | Refills: 1 | Status: CANCELLED | OUTPATIENT
Start: 2024-05-09

## 2024-05-10 NOTE — TELEPHONE ENCOUNTER
Requesting increase to 10 mg  Requested Prescriptions     Pending Prescriptions Disp Refills    Tirzepatide-Weight Management (ZEPBOUND) 10 MG/0.5ML Subcutaneous Solution Auto-injector 2 mL 0     Sig: Inject 10 mg into the skin once a week for 4 doses.       LOV: 04/22/2024  RTC: in about 3 months  Filled: 03/15/2024 #2ml with 1 refills    Future Appointments   Date Time Provider Department Center   9/13/2024 10:20 AM Jenna Adan APRN [facility]

## 2024-05-10 NOTE — TELEPHONE ENCOUNTER
From: Clau Santamaria  To: Jenna Adan  Sent: 5/9/2024 5:30 PM CDT  Subject: Found Zepbound need Rx    Hello,    I was able to find Zepbound at [facility] in [place]. They only have 10mg and i’m on my second month of 7.5. I can’t find 7.5 or 5 anywhere. Can I move up to 10? If so, can you send the RX to the [facility] in [place]?    Thanks,    Clau Santamaria

## 2024-05-12 RX ORDER — TIRZEPATIDE 10 MG/.5ML
10 INJECTION, SOLUTION SUBCUTANEOUS WEEKLY
Qty: 2 ML | Refills: 1 | Status: SHIPPED | OUTPATIENT
Start: 2024-05-12 | End: 2024-06-03

## 2024-06-18 ENCOUNTER — PATIENT MESSAGE (OUTPATIENT)
Dept: INTERNAL MEDICINE CLINIC | Facility: CLINIC | Age: 39
End: 2024-06-18

## 2024-06-18 DIAGNOSIS — E66.9 OBESITY WITH BODY MASS INDEX (BMI) OF 35.0 TO 39.9 WITHOUT COMORBIDITY: Primary | ICD-10-CM

## 2024-06-19 RX ORDER — TIRZEPATIDE 10 MG/.5ML
10 INJECTION, SOLUTION SUBCUTANEOUS WEEKLY
Qty: 2 ML | Refills: 1 | Status: SHIPPED | OUTPATIENT
Start: 2024-06-19 | End: 2024-07-11

## 2024-06-19 NOTE — TELEPHONE ENCOUNTER
From: Clau Santamaria  To: Jenna Adan  Sent: 6/18/2024 11:33 PM CDT  Subject: Zepbound 10mg out of stock    Hello,    I’m currently ob Zepbound 10mg and i’ve been searching for it for ~3 weeks now. The Rx has 7.5 or 12.5 in stock. My current weight on my home scale is 206. What would you recommend. They expect it will be out until late June, so maybe I will get lunch. I have enough to take this Friday so I will need to figure it out my next Friday.     Thanks,    Clau

## 2024-06-21 NOTE — TELEPHONE ENCOUNTER
Approved    Prior authorization approved  Payer: [facility] Rob Case ID: [id number]    [phone]    [phone]  Note from payer: Your PA request has been approved.  Additional information will be provided in the approval communication. (Message 1143)  Approval Details    Authorized from June 20, 2024 to June 20, 2025  Electronic appeal: Not supported  View History  Medication Being Authorized    Tirzepatide-Weight Management (ZEPBOUND) 10 MG/0.5ML Subcutaneous Solution Auto-injector  Inject 10 mg into the skin once a week for 4 doses.  Dispense: 2 mL Refills: 1   Start: 6/19/2024 End: 7/11/2024   Class: Normal Diagnoses: Obesity with body mass index (BMI) of 35.0 to 39.9 without comorbidity   This order has been released to its destination.  To be filled at: [facility]/pharmacy #[id number] - [place], [place] - [address] [phone], [phone]

## 2024-07-16 RX ORDER — TIRZEPATIDE 10 MG/.5ML
10 INJECTION, SOLUTION SUBCUTANEOUS
Qty: 2 ML | Refills: 0 | OUTPATIENT
Start: 2024-07-16

## 2024-07-16 NOTE — TELEPHONE ENCOUNTER
Requesting   Requested Prescriptions     Pending Prescriptions Disp Refills    ZEPBOUND 10 MG/0.5ML Subcutaneous Solution Auto-injector [Pharmacy Med Name: Zepbound Subcutaneous Solution Auto-injector 10 MG/0.5ML] 2 mL 0     Sig: INJECT 10MG INTO THE SKIN EVERY 7 DAYS       LOV: 4/22/2024  RTC:   Last Relevant Labs:   Filled: 6/19/24 #2ml with 1 refills    Future Appointments   Date Time Provider Department Center   9/13/2024 10:20 AM Jenna Adan APRN [facility]

## 2024-09-13 ENCOUNTER — TELEMEDICINE (OUTPATIENT)
Dept: INTERNAL MEDICINE CLINIC | Facility: CLINIC | Age: 39
End: 2024-09-13
Payer: COMMERCIAL

## 2024-09-13 DIAGNOSIS — F43.9 STRESS: ICD-10-CM

## 2024-09-13 DIAGNOSIS — E66.9 OBESITY WITH BODY MASS INDEX (BMI) OF 35.0 TO 39.9 WITHOUT COMORBIDITY: ICD-10-CM

## 2024-09-13 DIAGNOSIS — Z51.81 THERAPEUTIC DRUG MONITORING: Primary | ICD-10-CM

## 2024-09-13 DIAGNOSIS — E78.6 LOW HDL (UNDER 40): ICD-10-CM

## 2024-09-13 DIAGNOSIS — R73.03 PREDIABETES: ICD-10-CM

## 2024-09-13 RX ORDER — TIRZEPATIDE 10 MG/.5ML
10 INJECTION, SOLUTION SUBCUTANEOUS WEEKLY
Qty: 2 ML | Refills: 3 | Status: SHIPPED | OUTPATIENT
Start: 2024-09-13 | End: 2024-10-05

## 2024-09-13 NOTE — PROGRESS NOTES
[facility] WEIGHT MANAGEMENT VIRTUAL ENCOUNTER     Clau Santamaria verbally consents to a Virtual/Telephone Check-In service on 09/13/24   Patient understands and accepts financial responsibility for any deductible, co-insurance and/or co-pays associated with this service.    HISTORY OF PRESENT ILLNESS  Chief Complaint   Patient presents with    Other     F/u on weight management      Clau Santamaria is a 39 year old female is being evaluated as a video visit using Telemedicine with live, interactive video and audio    Weight gain/loss since LOV based on home monitoring:   Home scale: #199 lbs 220  Has lost  #21 lbs since LOV 5 months ago     Compliance with medication: zepbound 10mg weekly   Tolerating well, helping with decreasing appetite and no side effects     Success: got below #200 lbs  Challenging: need to workout more   Feels like she needs to increase weight training   Has been doing great with protein intake   No cravings for sweets (ie. Candy)   Exercise/Activity: 2x/ week, via walking, not doing anything routine as far as exercise  Nutrition: eating regular meals, +protein, minimal veggies. not tracking reports  Stress is manageable -5/10   Sleep: 6 hours/night, waking up feeling tired    Denies chest pain, shortness of breath, dizziness, blurred vision, headache, paresthesia, nausea/vomiting.     Wt Readings from Last 6 Encounters:   04/22/24 220 lb (99.8 kg)   02/09/24 234 lb (106.1 kg)   05/03/23 240 lb (108.9 kg)   12/30/22 240 lb 3.2 oz (109 kg)   11/21/22 236 lb (107 kg)   10/05/22 234 lb (106.1 kg)          Subjective  REVIEW OF SYSTEMS  GENERAL HEALTH: feels well otherwise, denied any fevers chills or night sweats   RESPIRATORY: denies shortness of breath   CARDIOVASCULAR: denies chest pain  GI: denies abdominal pain  PSYCH: denies any mood changes    Objective  EXAM  Reviewed most recent set of vitals   Physical Exam:  GENERAL: well developed, well nourished, in no apparent  distress, speaking in full sentences comfortably   SKIN: warm, pink, dry without rashes to exposed area   EYES: conjunctiva pink  HEENT: atraumatic, normocephalic  LUNGS: normal work of breathing, non labored  CARDIO: normal work, no exertion  EXTREMITIES: no cyanosis, no clubbing, no edema  NEURO: Oriented times three  PSYCH: pleasant, cooperative, normal mood and affect    Lab Results   Component Value Date    WBC 5.2 02/08/2023    RBC 4.15 02/08/2023    HGB 12.1 02/08/2023    HCT 36.5 02/08/2023    MCV 88.0 02/08/2023    MCH 29.2 02/08/2023    MCHC 33.2 02/08/2023    RDW 13.3 02/08/2023    .0 02/08/2023     Lab Results   Component Value Date     (H) 02/08/2023    BUN 9 02/08/2023    BUNCREA 12.9 07/23/2020    CREATSERUM 0.70 02/08/2023    ANIONGAP 5 02/08/2023    GFRNAA 115 09/28/2021    GFRAA 133 09/28/2021    CA 8.9 02/08/2023    OSMOCALC 286 02/08/2023    ALKPHO 25 (L) 02/08/2023    AST 11 (L) 02/08/2023    ALT 23 02/08/2023    BILT 0.4 02/08/2023    TP 7.3 02/08/2023    ALB 3.8 02/08/2023    GLOBULIN 3.5 02/08/2023     02/08/2023    K 4.1 02/08/2023     02/08/2023    CO2 24.0 02/08/2023     Lab Results   Component Value Date     02/08/2023    A1C 5.9 (H) 02/08/2023     Lab Results   Component Value Date    CHOLEST 102 02/08/2023    TRIG 66 02/08/2023    HDL 48 02/08/2023    LDL 40 02/08/2023    VLDL 9 02/08/2023    NONHDLC 54 02/08/2023     Lab Results   Component Value Date    T4F 1.0 09/28/2021    TSH 1.060 02/08/2023     No results found for: \"B12\", \"VITB12\"  No results found for: \"VITD\", \"QVITD\", \"BAAI68OR\"    Current Outpatient Medications on File Prior to Visit   Medication Sig Dispense Refill    Tirzepatide-Weight Management (ZEPBOUND) 7.5 MG/0.5ML Subcutaneous Solution Auto-injector Inject 7.5 mg into the skin once a week. 2 mL 1    Phentermine HCl 37.5 MG Oral Tab Take 1 tablet (37.5 mg total) by mouth every morning before breakfast. (Patient not taking: Reported on  4/22/2024) 30 tablet 2    tranexamic acid (LYSTEDA) 650 MG Oral Tab Take 2 tablets (1,300 mg total) by mouth every 8 (eight) hours. For up to 5 days every month with menses 90 tablet 2    cyanocobalamin 100 MCG Oral Tab Take 2.5 tablets (250 mcg total) by mouth daily.      Ferrous Gluconate 324 (38 Fe) MG Oral Tab Take 1 tablet (325 mg total) by mouth daily with breakfast.      Fluticasone Propionate 50 MCG/ACT Nasal Suspension SPRAY 1 to 2 SPRAYS INTO EACH NOSTRIL EVERY DAY        No current facility-administered medications on file prior to visit.       ASSESSMENT  Analyzed weight data:       Diagnoses and all orders for this visit:    Therapeutic drug monitoring    Obesity with body mass index (BMI) of 35.0 to 39.9 without comorbidity    Stress    Prediabetes    Low HDL (under 40)    Other orders  -     Tirzepatide-Weight Management (ZEPBOUND) 10 MG/0.5ML Subcutaneous Solution Auto-injector; Inject 10 mg into the skin once a week for 4 doses.        PLAN  Continue with medications: zepbound 10mg weekly (will stay at this dosage)   --advised of side effects and adverse effects of this medication  Contradictions: none, pre-glucoma ocular hypertension, per patient resolved, stopped due phentermine due to side effects    Reviewed labs- has labs pended from PCP  HLD  Stable, (low HDL)- encouraged exercise, follows with PCP  prediabetes, reviewed last a1c 5.9% on 2/2023   encourage physical exercise and activity  Wrote out macros and encouraged tracking  Advised to monitor blood pressure and pulse at home/ given parameters to review and contact provider.  Nutrition: low carb diet/ recommended to eat breakfast daily/ regular protein intake  Follow up with dietitian and psychologist as recommended.  Discussed the role of sleep and stress in weight management.  Counseled on comprehensive weight loss plan including attention to nutrition, exercise and behavior/stress management for success. See patient instruction below  for more details.  Discussed strategies to overcome barriers to successful weight loss and weight maintenance  FITTE: ACSM recommendations (150-300 minutes/ week in active weight loss)       There are no Patient Instructions on file for this visit.    No follow-ups on file.    Patient verbalizes understanding.    Total time spent on chart review, pre-charting, obtaining history, counseling, and educating, reviewing labs was 23 minutes.       Pt understands phone/video evaluation is not a substitute for face to face examination or emergency care. Pt advised to go to the ER or call 911 for worsening symptoms or acute distress.       Please note that the following visit was completed using two-way, real-time interactive audio and/or video communication.  This has been done in good constance to provide continuity of care in the best interest of the provider-patient relationship, due to the ongoing public health crisis/national emergency and because of restrictions of visitation.  There are limitations of this visit as no physical exam could be performed.  Every conscious effort was taken to allow for sufficient and adequate time.  This billing was spent on reviewing labs, medications, radiology tests and decision making.  Appropriate medical decision-making and tests are ordered as detailed in the plan of care above.     NOTE TO PATIENT: The 21st Century Cures Act makes clinical notes like these available to patients in the interest of transparency. Clinical notes are medical documents used by physicians and care providers to communicate with each other. These documents include medical language and terminology, abbreviations, and treatment information that may sound technical and at times possibly unfamiliar. In addition, at times, the verbiage may appear blunt or direct. These documents are one tool providers use to communicate relevant information and clinical opinions of the care providers in a way that allows common  understanding of the clinical context.     Jenna Adan, APRN  9/13/2024

## 2024-09-13 NOTE — PATIENT INSTRUCTIONS
Next steps:  1.  Fill your prescribed medication and take as discussed and prescribed: zepbound 10mg weekly   2.  Schedule a personal nutrition consultation with one of our registered dieticians     Please try to work on the following dietary changes:    1.  Drink water with meals and throughout the day, cut down on soda and/or juice if consumed. Consider flavored water options like Bubbly, Spindrift, Hint and India.  2.  Eat breakfast daily and focus on having protein with each meal, examples include: greek yogurt, cottage cheese, hard boiled egg, whole grain toast with peanut butter.   3.  Reduce refined carbohydrates and sugars which includes items such as sweets, as well as rice, pasta, and bread and make sure to choose whole grain options when having them with just 1 serving per meal about the size of your inner palm.  4.  Consume non starchy veggies daily working towards making them a good 50% of your daily food intake. Add them to lunch and dinner consistently.  5.  Start a daily probiotic: VSL#3 is recommended, (order on line at www.vsl3.com). Take 1 capsule daily with water for 30 days, then reduce to 1 every other day (this will reduce the cost). Capsules can be left out for 2 weeks, but then must be refrigerated.      Please download teresa My Fitness Pal, LoseIt! Or Net Diary to monitor daily dietary intake and you will be able to see if you are eating the right amount of calories or too much or too little which would hinder weight loss. Additionally this will help to see your daily carbohydrate and protein intake. When you set the teresa up choose 1-2 lbs/week as a goal.  Keeping a paper food journal is an option as well to remain accountable for your choices- this is the start to mindful eating! A low calorie diet has been consistently shown to support weight loss.     Continue or start exercising to help establish a routine. If not already exercising begin with 1 day and progress as able with long-term  goal of 30 minutes 5 days a week at a minimum.     Meditation daily can help manage and control stress. Chronic stress can make weight loss difficult.  Exercising is one way to help with stress, but meditation using the CALM Mendel or another comparable alternative can be done in your home or place of work with little time commitment. This Mendel can also help work on behavior change and improve sleep. Check out the segment under Calm Masterclass and listen to The 4 Pillars of Health. A great way to begin learning about the foundation of lifestyle with practical tips to use in your every day.     Check out www.yourweightmatters.org blog for continued daily support and education along this weight loss journey!    Patient Resources:     Personal Training/Fitness Classes/Health Coaching     [facility] @ [email or web address] Full fitness center with group fitness and personal training. Discount available as client of [employer] [facility].  Health Coaching and Personal Training with Lady Paez at our [facility]- individual weekly coaching with option to add personal training and small group fitness classes targeted at weight loss- [phone] and/or email @ [email or web address]  360FIT [place] [email or web address].[email or web address] Group Fitness [phone] and/or email [email or web address]  [facility] Fitness Centers with multiple locations: OmniVec (www.Haolianluo), Eat The Polytouch Medical Fitness (www.PlayCafe.[employer]ands), Fit Body Bootcamp (www.itsDapperbodybootThink Realtimep.[employer]ands), [employer] Fitness (www.Redox Pharmaceutical), The Exercise  (www.exercisecoach.[employer])     Online Fitness  Fitness  on Utube  Fit in 10 DVD series- www.pnhtv74IHR.com  Sit and Be Fit - Chair exercise series Www.sitandbefit.org  Hip Hop Fit with Pino Moreno at www.hiphopfit.net     Apps for on the Go Fitness  [place] 7 Minute Workout  (orange box with white 7) - free on the go HIIT training mendel  Peloton Mendel @ www.onepeloton.com     Nutrition Trackers and Tools  LoseIT! And My Fitness Pal apps and on line for tracking nutrition  NOOM - virtual health coaching  FitFoundation (healthy meals on the go) in [place] @ www.gatfgokkfrcyy4v.OmegaGenesis  Bistro MD @ www.bistromd.com and Gjeqfo83 (keto and low carb plans recommended) @ www.vzukjs28.com, Metabolic Meals @ www.Incident TechnologiesMetabolicMeals.com - individual prepared meals to go  Gobble, Blue Apron, Home , Every Plate, Sunbasket- on line meal delivery programs for preparation at home  Meal Village in [place] for homemade meals to go @ www.mealvillage.OmegaGenesis  Diet Doctor @ www.dietdoctor.com - low carb swaps  Yummly - meal prep and planning mendel (www.yummly.com)     Stress Management/Behavior/Mindful Eating  CALM meditation mendel (www.calm.com)  Headspace  Am I Hungry? Mindful eating virtual  mendel  Www.yourweightmatters.org - Obesity Action Coalition sponsored Blog posts daily  Motivation mendel (black box with white \")- daily supportive messages sent to your phone     Books/Video Education/Podcasts  Mindless Eating by Celos Shaikh  Why We Get Sick by Harpreet Worrell (a book about insulin resistance)  Atomic Habits by Cedric Walker (a book about taking small steps to promote greater behavior change)   Can't Hurt Me by Crow Bautista (a book exploring the power of discipline in achieving your goals)  The End of Dieting: How to Live for Life by Dr. Elian Metzger M.D. or listen to The Meal Mantra Podcast Episode 63: Understanding \"Nutritarian\" Eating w/Dr. Elian Metzger  Your Body in Balance: The New Science of Food, Hormones, and Health by Dr. Faustino Salinas  The Menopause Diet Plan by Marifer Guerra and Candi Hooker  The Complete Guide to fasting by Dr. Sal  Sugar, Salt & Fat by Cate Enrique, Ph.D, R.D.  Weight Loss Surgery Will Not Treat Food Addiction by Merline Boyer Ph.D  The Game Changers- NetBreakthrough Behavioral  Documentary on plant based nutrition  Fed Up - documentary about obesity (Free on Utube)  The Truth About Sugar - documentary on sugar (Free on Utube, https://youtu.be/7B0tyjzXN0y)  The Dr. Hsieh T5 Wellness Plan by Dr. Joseph Hsieh MD  Fitlosophy Fitspiration - journal to better health (found at [employer] in fitness aisle)  What Happened to You?- a look at the impact trauma has on behavior written by Mari Madden and Dr. Naeem David  Whole Again by Fritz Subramanian - discovering your true self after trauma  Rolando Herrera talk on Coinapult, The Call to Courage  Podcasts: The Exam Room by the Physician's Committee, Nutrition Facts by Dr. Orantes    We are here to support you with weight loss, but please remember that you still need your primary care provider for your routine health maintenance.

## 2024-10-17 ENCOUNTER — OFFICE VISIT (OUTPATIENT)
Dept: FAMILY MEDICINE CLINIC | Facility: CLINIC | Age: 39
End: 2024-10-17
Payer: COMMERCIAL

## 2024-10-17 VITALS
SYSTOLIC BLOOD PRESSURE: 100 MMHG | DIASTOLIC BLOOD PRESSURE: 70 MMHG | OXYGEN SATURATION: 99 % | HEART RATE: 89 BPM | TEMPERATURE: 97 F | HEIGHT: 67 IN | WEIGHT: 198 LBS | BODY MASS INDEX: 31.08 KG/M2 | RESPIRATION RATE: 16 BRPM

## 2024-10-17 DIAGNOSIS — Z28.21 INFLUENZA VACCINE REFUSED: ICD-10-CM

## 2024-10-17 DIAGNOSIS — Z51.81 THERAPEUTIC DRUG MONITORING: ICD-10-CM

## 2024-10-17 DIAGNOSIS — J40 BRONCHITIS: Primary | ICD-10-CM

## 2024-10-17 PROBLEM — R19.7 DIARRHEA: Status: RESOLVED | Noted: 2022-11-21 | Resolved: 2024-10-17

## 2024-10-17 PROCEDURE — 99213 OFFICE O/P EST LOW 20 MIN: CPT | Performed by: FAMILY MEDICINE

## 2024-10-17 PROCEDURE — 3078F DIAST BP <80 MM HG: CPT | Performed by: FAMILY MEDICINE

## 2024-10-17 PROCEDURE — 3008F BODY MASS INDEX DOCD: CPT | Performed by: FAMILY MEDICINE

## 2024-10-17 PROCEDURE — 3074F SYST BP LT 130 MM HG: CPT | Performed by: FAMILY MEDICINE

## 2024-10-17 RX ORDER — METHYLPREDNISOLONE 4 MG/1
TABLET ORAL
Qty: 21 EACH | Refills: 0 | Status: SHIPPED | OUTPATIENT
Start: 2024-10-17

## 2024-10-17 NOTE — PROGRESS NOTES
Subjective:   Clau Santamaria is a 39 year old female who presents for Cough (Patient c/o cough x 2 wks )       History/Other:    Chief Complaint Reviewed and Verified  Nursing Notes Reviewed and   Verified  Tobacco Reviewed  Allergies Reviewed  Medications Reviewed    Medical History Reviewed  Surgical History Reviewed  Family History   Reviewed  Social History Reviewed         Cough  This is a chronic problem. Episode onset: 2 weeks. The cough is Non-productive.       Tobacco:  She has never smoked tobacco.    Current Outpatient Medications   Medication Sig Dispense Refill    Tirzepatide-Weight Management (ZEPBOUND) 7.5 MG/0.5ML Subcutaneous Solution Auto-injector Inject 7.5 mg into the skin once a week. 2 mL 1    tranexamic acid (LYSTEDA) 650 MG Oral Tab Take 2 tablets (1,300 mg total) by mouth every 8 (eight) hours. For up to 5 days every month with menses 90 tablet 2    Fluticasone Propionate 50 MCG/ACT Nasal Suspension SPRAY 1 to 2 SPRAYS INTO EACH NOSTRIL EVERY DAY            Review of Systems:  Review of Systems   Respiratory:  Positive for cough.      ***    Objective:   LMP 04/11/2023 (Exact Date)  Estimated body mass index is 34.46 kg/m² as calculated from the following:    Height as of 4/22/24: 5' 7\" (1.702 m).    Weight as of 4/22/24: 220 lb (99.8 kg).  Physical Exam  ***    Assessment & Plan:   1. Influenza vaccine refused (Primary)  -     Influenza Vaccine Refused (Order that documents the process)      {Tip  Follow Up:8307}  No follow-ups on file.    Corwin Lr DO, 10/17/2024, 3:03 PM

## 2024-10-17 NOTE — PROGRESS NOTES
Subjective:   Clau Santamaria is a 39 year old female who presents for Cough (Patient c/o cough x 2 wks )       History/Other:    Chief Complaint Reviewed and Verified  Nursing Notes Reviewed and   Verified  Tobacco Reviewed  Allergies Reviewed  Medications Reviewed    Problem List Reviewed  Medical History Reviewed  Surgical History   Reviewed  OB Status Reviewed  Family History Reviewed  Social History   Reviewed           38 y/o F presenting for 2 week hx of cough with no fever.     Cough  The cough is Productive of sputum. Associated symptoms include nasal congestion and postnasal drip. Pertinent negatives include no chest pain, chills, ear pain, fever, rhinorrhea, sore throat, shortness of breath, sweats, weight loss or wheezing. Risk factors: she is a nonsmoker. Treatments tried: mucinex. The treatment provided mild relief. Her past medical history is significant for bronchitis. There is no history of asthma, environmental allergies or pneumonia.   Of note, her daughter was diagnosed with pneumonia 3weeks ag0-she was on antibiotics and doing well now.   No recent travel  Home covid test is negative.         Tobacco:  She has never smoked tobacco.    Current Outpatient Medications   Medication Sig Dispense Refill    methylPREDNISolone (MEDROL) 4 MG Oral Tablet Therapy Pack As directed. 21 each 0    Tirzepatide-Weight Management (ZEPBOUND) 7.5 MG/0.5ML Subcutaneous Solution Auto-injector Inject 7.5 mg into the skin once a week. 2 mL 1    tranexamic acid (LYSTEDA) 650 MG Oral Tab Take 2 tablets (1,300 mg total) by mouth every 8 (eight) hours. For up to 5 days every month with menses 90 tablet 2    Fluticasone Propionate 50 MCG/ACT Nasal Suspension SPRAY 1 to 2 SPRAYS INTO EACH NOSTRIL EVERY DAY            Review of Systems:  Review of Systems   Constitutional:  Negative for chills, fever and weight loss.   HENT:  Positive for postnasal drip. Negative for ear pain, rhinorrhea and sore throat.     Respiratory:  Positive for cough. Negative for shortness of breath and wheezing.    Cardiovascular:  Negative for chest pain.   Allergic/Immunologic: Negative for environmental allergies.         Objective:   /70   Pulse 89   Temp 97.2 °F (36.2 °C) (Temporal)   Resp 16   Ht 5' 7\" (1.702 m)   Wt 198 lb (89.8 kg)   LMP 10/16/2024   SpO2 99%   BMI 31.01 kg/m²  Estimated body mass index is 31.01 kg/m² as calculated from the following:    Height as of this encounter: 5' 7\" (1.702 m).    Weight as of this encounter: 198 lb (89.8 kg).  Physical Exam  Constitutional:       General: She is not in acute distress.     Appearance: Normal appearance. She is well-developed. She is not ill-appearing.   HENT:      Head: Normocephalic and atraumatic.      Right Ear: Tympanic membrane, ear canal and external ear normal.      Left Ear: Tympanic membrane, ear canal and external ear normal.      Nose: Nose normal. No congestion or rhinorrhea.      Mouth/Throat:      Mouth: Mucous membranes are moist.      Pharynx: No oropharyngeal exudate.   Eyes:      Pupils: Pupils are equal, round, and reactive to light.   Neck:      Thyroid: No thyromegaly.   Cardiovascular:      Rate and Rhythm: Normal rate and regular rhythm.      Heart sounds: No murmur heard.     No friction rub. No gallop.   Pulmonary:      Effort: Pulmonary effort is normal. No respiratory distress.      Breath sounds: Normal breath sounds. No wheezing or rhonchi.      Comments: Dry, hcking cough during exam    Chest:      Chest wall: No tenderness.   Abdominal:      General: Bowel sounds are normal.      Palpations: Abdomen is soft.      Tenderness: There is no abdominal tenderness.   Musculoskeletal:      Cervical back: Neck supple.   Lymphadenopathy:      Cervical: No cervical adenopathy.   Skin:     General: Skin is warm and dry.   Neurological:      Mental Status: She is alert and oriented to person, place, and time.   Psychiatric:         Judgment:  Judgment normal.           Assessment & Plan:   1. Bronchitis (Primary)  -     methylPREDNISolone; As directed.  Dispense: 21 each; Refill: 0  2. Influenza vaccine refused  -     Influenza Vaccine Refused (Order that documents the process)    Patient with dry cough. suspect bronchitis. Patient is afebrile, no respiratory distress, lungs clear to auscultation bilaterally.  Viral in etiology likely.   F/u   if cough worsens or other alarming symptoms develop such as productive cough, fevers, chills, hematemesis, chest pain, sob, wheezing.       Return in about 1 week (around 10/24/2024).    Corwin Lr DO, 10/17/2024, 3:32 PM

## 2024-12-02 ENCOUNTER — OFFICE VISIT (OUTPATIENT)
Dept: FAMILY MEDICINE CLINIC | Facility: CLINIC | Age: 39
End: 2024-12-02
Payer: COMMERCIAL

## 2024-12-02 ENCOUNTER — LAB ENCOUNTER (OUTPATIENT)
Dept: LAB | Age: 39
End: 2024-12-02
Attending: FAMILY MEDICINE
Payer: COMMERCIAL

## 2024-12-02 VITALS
HEART RATE: 78 BPM | WEIGHT: 197.19 LBS | SYSTOLIC BLOOD PRESSURE: 112 MMHG | HEIGHT: 67 IN | DIASTOLIC BLOOD PRESSURE: 70 MMHG | BODY MASS INDEX: 30.95 KG/M2 | TEMPERATURE: 97 F | OXYGEN SATURATION: 98 % | RESPIRATION RATE: 18 BRPM

## 2024-12-02 DIAGNOSIS — Z00.00 LABORATORY EXAM ORDERED AS PART OF ROUTINE GENERAL MEDICAL EXAMINATION: ICD-10-CM

## 2024-12-02 DIAGNOSIS — Z23 NEED FOR VACCINATION: ICD-10-CM

## 2024-12-02 DIAGNOSIS — M54.50 ACUTE BILATERAL LOW BACK PAIN WITHOUT SCIATICA: Primary | ICD-10-CM

## 2024-12-02 DIAGNOSIS — R73.03 PREDIABETES: ICD-10-CM

## 2024-12-02 LAB
ALBUMIN SERPL-MCNC: 4.4 G/DL (ref 3.2–4.8)
ALBUMIN/GLOB SERPL: 1.6 {RATIO} (ref 1–2)
ALP LIVER SERPL-CCNC: 23 U/L
ALT SERPL-CCNC: 10 U/L
ANION GAP SERPL CALC-SCNC: 3 MMOL/L (ref 0–18)
AST SERPL-CCNC: 13 U/L (ref ?–34)
BASOPHILS # BLD AUTO: 0.02 X10(3) UL (ref 0–0.2)
BASOPHILS NFR BLD AUTO: 0.5 %
BILIRUB SERPL-MCNC: 0.5 MG/DL (ref 0.3–1.2)
BUN BLD-MCNC: 15 MG/DL (ref 9–23)
CALCIUM BLD-MCNC: 9.3 MG/DL (ref 8.7–10.4)
CHLORIDE SERPL-SCNC: 112 MMOL/L (ref 98–112)
CHOLEST SERPL-MCNC: 153 MG/DL (ref ?–200)
CO2 SERPL-SCNC: 23 MMOL/L (ref 21–32)
CREAT BLD-MCNC: 0.75 MG/DL
EGFRCR SERPLBLD CKD-EPI 2021: 103 ML/MIN/1.73M2 (ref 60–?)
EOSINOPHIL # BLD AUTO: 0.07 X10(3) UL (ref 0–0.7)
EOSINOPHIL NFR BLD AUTO: 1.8 %
ERYTHROCYTE [DISTWIDTH] IN BLOOD BY AUTOMATED COUNT: 13.7 %
EST. AVERAGE GLUCOSE BLD GHB EST-MCNC: 100 MG/DL (ref 68–126)
FASTING PATIENT LIPID ANSWER: YES
FASTING STATUS PATIENT QL REPORTED: YES
GLOBULIN PLAS-MCNC: 2.8 G/DL (ref 2–3.5)
GLUCOSE BLD-MCNC: 91 MG/DL (ref 70–99)
HBA1C MFR BLD: 5.1 % (ref ?–5.7)
HCT VFR BLD AUTO: 36.7 %
HDLC SERPL-MCNC: 48 MG/DL (ref 40–59)
HGB BLD-MCNC: 12.2 G/DL
IMM GRANULOCYTES # BLD AUTO: 0.01 X10(3) UL (ref 0–1)
IMM GRANULOCYTES NFR BLD: 0.3 %
LDLC SERPL CALC-MCNC: 85 MG/DL (ref ?–100)
LYMPHOCYTES # BLD AUTO: 1.32 X10(3) UL (ref 1–4)
LYMPHOCYTES NFR BLD AUTO: 34.3 %
MCH RBC QN AUTO: 29.7 PG (ref 26–34)
MCHC RBC AUTO-ENTMCNC: 33.2 G/DL (ref 31–37)
MCV RBC AUTO: 89.3 FL
MONOCYTES # BLD AUTO: 0.32 X10(3) UL (ref 0.1–1)
MONOCYTES NFR BLD AUTO: 8.3 %
NEUTROPHILS # BLD AUTO: 2.11 X10 (3) UL (ref 1.5–7.7)
NEUTROPHILS # BLD AUTO: 2.11 X10(3) UL (ref 1.5–7.7)
NEUTROPHILS NFR BLD AUTO: 54.8 %
NONHDLC SERPL-MCNC: 105 MG/DL (ref ?–130)
OSMOLALITY SERPL CALC.SUM OF ELEC: 286 MOSM/KG (ref 275–295)
PLATELET # BLD AUTO: 154 10(3)UL (ref 150–450)
POTASSIUM SERPL-SCNC: 3.8 MMOL/L (ref 3.5–5.1)
PROT SERPL-MCNC: 7.2 G/DL (ref 5.7–8.2)
RBC # BLD AUTO: 4.11 X10(6)UL
SODIUM SERPL-SCNC: 138 MMOL/L (ref 136–145)
T4 FREE SERPL-MCNC: 1.3 NG/DL (ref 0.8–1.7)
TRIGL SERPL-MCNC: 109 MG/DL (ref 30–149)
TSI SER-ACNC: 1.59 UIU/ML (ref 0.55–4.78)
VLDLC SERPL CALC-MCNC: 17 MG/DL (ref 0–30)
WBC # BLD AUTO: 3.9 X10(3) UL (ref 4–11)

## 2024-12-02 PROCEDURE — 3078F DIAST BP <80 MM HG: CPT | Performed by: FAMILY MEDICINE

## 2024-12-02 PROCEDURE — 3074F SYST BP LT 130 MM HG: CPT | Performed by: FAMILY MEDICINE

## 2024-12-02 PROCEDURE — 99213 OFFICE O/P EST LOW 20 MIN: CPT | Performed by: FAMILY MEDICINE

## 2024-12-02 PROCEDURE — 83036 HEMOGLOBIN GLYCOSYLATED A1C: CPT

## 2024-12-02 PROCEDURE — 36415 COLL VENOUS BLD VENIPUNCTURE: CPT

## 2024-12-02 PROCEDURE — 80061 LIPID PANEL: CPT

## 2024-12-02 PROCEDURE — 85025 COMPLETE CBC W/AUTO DIFF WBC: CPT

## 2024-12-02 PROCEDURE — 84439 ASSAY OF FREE THYROXINE: CPT

## 2024-12-02 PROCEDURE — 84443 ASSAY THYROID STIM HORMONE: CPT

## 2024-12-02 PROCEDURE — 3008F BODY MASS INDEX DOCD: CPT | Performed by: FAMILY MEDICINE

## 2024-12-02 PROCEDURE — 80053 COMPREHEN METABOLIC PANEL: CPT

## 2024-12-02 RX ORDER — TIRZEPATIDE 10 MG/.5ML
10 INJECTION, SOLUTION SUBCUTANEOUS
COMMUNITY
Start: 2024-11-06

## 2024-12-02 NOTE — PROGRESS NOTES
CHIEF COMPLAINT:     Chief Complaint   Patient presents with    Low Back Pain     Patient states she has been experiencing lower back stiffness for the past 2 weeks, patient denies any injury but states her back is feeling better today.       HPI:   Clau Santamaria is a 39 year old female who is here for complaints of low back pain.  Pain is located at low back. Pain is described as aching, cramping. Severity is moderate. The pain radiates to no radiation of pain. Pain was precipitated by unknown. Has had for 1  weeks. Pain is worsened by bending, twisting, lifting. Gets relief of pain with heat massage and stretching. Physical activity helped as well.. Prior pain hx: recurrent self limited episodes of low back pain in the past.   Denies octavia loss of bowel or bladder control.    Current Outpatient Medications   Medication Sig Dispense Refill    ZEPBOUND 10 MG/0.5ML Subcutaneous Solution Auto-injector 10 mg.      tranexamic acid (LYSTEDA) 650 MG Oral Tab Take 2 tablets (1,300 mg total) by mouth every 8 (eight) hours. For up to 5 days every month with menses 90 tablet 2    Fluticasone Propionate 50 MCG/ACT Nasal Suspension SPRAY 1 to 2 SPRAYS INTO EACH NOSTRIL EVERY DAY         Past Medical History:    Benign thyroid cyst    Endometriosis    Endometriosis      Social History:  Social History     Socioeconomic History    Marital status:    Tobacco Use    Smoking status: Never     Passive exposure: Never    Smokeless tobacco: Never   Vaping Use    Vaping status: Never Used   Substance and Sexual Activity    Alcohol use: Yes     Comment: OCCASIONALLY    Drug use: Never   Other Topics Concern    Caffeine Concern Yes     Comment: 1 cup coffee or red bull 3x week    Exercise Yes     Comment: biking 2-3 days a week 5-15miles.     Social Drivers of Health     Physical Activity: Unknown (5/1/2019)    Received from [facility], [facility]    Exercise Vital Sign     Days of Exercise per Week: 0  days        REVIEW OF SYSTEMS:   GENERAL: feels well otherwise  SKIN: denies any unusual skin lesions  LUNGS: denies shortness of breath   CARDIOVASCULAR: denies chest pain or palpitations  GI: denies abdominal pain, N/VC/D.  Denies heartburn  : no dysuria, urgency or flank pain.  MUSCULOSKELETAL: Per HPI.  No other joints are affected  NEURO: No numbness or tingling.  No loss of bowel or bladder control.    EXAM:   /70 (BP Location: Left arm, Patient Position: Sitting, Cuff Size: adult)   Pulse 78   Temp 97.1 °F (36.2 °C) (Temporal)   Resp 18   Ht 5' 7\" (1.702 m)   Wt 197 lb 3.2 oz (89.4 kg)   LMP 11/12/2024 (Exact Date)   SpO2 98%   BMI 30.89 kg/m²    GENERAL: well developed, well nourished,in no apparent distress  SKIN: no rashes,no suspicious lesions  NECK: supple,no adenopathy,no bruits  LUNGS: clear to auscultation  CARDIO: RRR without murmur  GI: normoactive bs x4, no masses, HSM or tenderness  EXTREMITIES: no cyanosis, clubbing or edema  BACK: lumbosacral tenderness  NEURO: DTR's intact and equal bilaterally.  Sensation intact.    ASSESSMENT:   Clau Santamaria is a 39 year old female who presents with complaints of low back   Findings are consistent with   Encounter Diagnoses   Name Primary?    Acute bilateral low back pain without sciatica Yes    Need for vaccination          PLAN:   Comfort care as listed in patient instructions as well as bid ROM exercises and bid local heat, rest, heat, NSAIDS, stretching exercises discussed with patient and written info given Medication as below.    Risks, benefits, side effects of medication explained and discussed.     Physical therapy if pain continues to reoccur.       The patient is asked to return if sx's persist or worsen.  Patient Instructions   Back Pain Exercises   Exercises that stretch and strengthen the muscles of your abdomen and spine can help prevent back problems. If your back and abdominal muscles are strong, you can maintain good  posture and keep your spine in its correct position.   If your muscles are tight, take a warm shower or bath before doing the exercises. Exercise on a rug or mat. Wear loose clothing. Do not wear shoes. Stop doing any exercise that causes pain until you have talked with your provider.   These exercises are intended only as suggestions. Ask your provider or physical therapist to help you develop an exercise program. Check with your provider before starting the exercises. Ask your provider how many times a week you need to do the exercises.   Caution: If you have a herniated disk or other disk problem, check with your healthcare provider before doing these exercises.   Exercises   Standing hamstring stretch: Place the heel of your leg on a stool about 15 inches high. Keep your knee straight. Lean forward, bending at the hips until you feel a mild stretch in the back of your thigh. Make sure you do not roll your shoulders and bend at the waist when doing this or you will stretch your lower back instead. Hold the stretch for 15 to 30 seconds. Repeat 3 times for each leg.   Repeat the same stretch on your other leg.   Cat and camel: Get down on your hands and knees. Let your stomach sag, allowing your back to curve downward. Hold this position for 5 seconds. Then arch your back and hold for 5 seconds. Do 3 sets of 10.   Quadruped arm/leg raise: Get down on your hands and knees. Tighten your abdominal muscles to stiffen your spine. While keeping your abdominals tight, raise one arm and the opposite leg away from you. Hold this position for 5 seconds. Lower your arm and leg slowly and alternate sides. Do this 10 times on each side.   Pelvic tilt: Lie on your back with your knees bent and your feet flat on the floor. Tighten your abdominal muscles and push your lower back into the floor. Hold this position for 5 seconds, then relax. Do 3 sets of 10.   Partial curl: Lie on your back with your knees bent and your feet flat on  the floor. Tighten your stomach muscles and flatten your back against the floor. Tuck your chin to your chest. With your hands stretched out in front of you, curl your upper body forward until your shoulders clear the floor. Hold this position for 3 seconds. Don't hold your breath. It helps to breathe out as you lift your shoulders up. Relax. Repeat 10 times. Build to 3 sets of 10. To challenge yourself, clasp your hands behind your head and keep your elbows out to the side.   Gluteal stretch: Lying on your back with both knees bent, rest the ankle of one leg over the knee of your other leg. Grasp the thigh of the bottom leg and pull that knee toward your chest. You will feel a stretch along the buttocks and possibly along the outside of your hip on the top leg. Hold this for 15 to 30 seconds. Repeat 3 times.   Extension exercise: Lie face down on the floor for 5 minutes. If this hurts too much, lie face down with a pillow under your stomach. This should relieve your leg or back pain. When you can lie on your stomach for 5 minutes without a pillow, then you can continue with the rest of this exercise.   After lying on your stomach for 5 minutes, prop yourself up on your elbows for another 5 minutes. Lie flat again for 1 minute, then press down on your hands and extend your elbows while keeping your hips flat on the floor. Hold for 1 second and lower yourself to the floor. Repeat 10 times. Do 4 sets. Rest for 2 minutes between sets. You should have no pain in your legs when you do this, but it is normal to feel pain in your lower back. Do this several times a day.

## 2024-12-02 NOTE — PATIENT INSTRUCTIONS
Back Pain Exercises   Exercises that stretch and strengthen the muscles of your abdomen and spine can help prevent back problems. If your back and abdominal muscles are strong, you can maintain good posture and keep your spine in its correct position.   If your muscles are tight, take a warm shower or bath before doing the exercises. Exercise on a rug or mat. Wear loose clothing. Do not wear shoes. Stop doing any exercise that causes pain until you have talked with your provider.   These exercises are intended only as suggestions. Ask your provider or physical therapist to help you develop an exercise program. Check with your provider before starting the exercises. Ask your provider how many times a week you need to do the exercises.   Caution: If you have a herniated disk or other disk problem, check with your healthcare provider before doing these exercises.   Exercises   Standing hamstring stretch: Place the heel of your leg on a stool about 15 inches high. Keep your knee straight. Lean forward, bending at the hips until you feel a mild stretch in the back of your thigh. Make sure you do not roll your shoulders and bend at the waist when doing this or you will stretch your lower back instead. Hold the stretch for 15 to 30 seconds. Repeat 3 times for each leg.   Repeat the same stretch on your other leg.   Cat and camel: Get down on your hands and knees. Let your stomach sag, allowing your back to curve downward. Hold this position for 5 seconds. Then arch your back and hold for 5 seconds. Do 3 sets of 10.   Quadruped arm/leg raise: Get down on your hands and knees. Tighten your abdominal muscles to stiffen your spine. While keeping your abdominals tight, raise one arm and the opposite leg away from you. Hold this position for 5 seconds. Lower your arm and leg slowly and alternate sides. Do this 10 times on each side.   Pelvic tilt: Lie on your back with your knees bent and your feet flat on the floor. Tighten  your abdominal muscles and push your lower back into the floor. Hold this position for 5 seconds, then relax. Do 3 sets of 10.   Partial curl: Lie on your back with your knees bent and your feet flat on the floor. Tighten your stomach muscles and flatten your back against the floor. Tuck your chin to your chest. With your hands stretched out in front of you, curl your upper body forward until your shoulders clear the floor. Hold this position for 3 seconds. Don't hold your breath. It helps to breathe out as you lift your shoulders up. Relax. Repeat 10 times. Build to 3 sets of 10. To challenge yourself, clasp your hands behind your head and keep your elbows out to the side.   Gluteal stretch: Lying on your back with both knees bent, rest the ankle of one leg over the knee of your other leg. Grasp the thigh of the bottom leg and pull that knee toward your chest. You will feel a stretch along the buttocks and possibly along the outside of your hip on the top leg. Hold this for 15 to 30 seconds. Repeat 3 times.   Extension exercise: Lie face down on the floor for 5 minutes. If this hurts too much, lie face down with a pillow under your stomach. This should relieve your leg or back pain. When you can lie on your stomach for 5 minutes without a pillow, then you can continue with the rest of this exercise.   After lying on your stomach for 5 minutes, prop yourself up on your elbows for another 5 minutes. Lie flat again for 1 minute, then press down on your hands and extend your elbows while keeping your hips flat on the floor. Hold for 1 second and lower yourself to the floor. Repeat 10 times. Do 4 sets. Rest for 2 minutes between sets. You should have no pain in your legs when you do this, but it is normal to feel pain in your lower back. Do this several times a day.

## 2025-01-17 ENCOUNTER — PATIENT MESSAGE (OUTPATIENT)
Dept: FAMILY MEDICINE CLINIC | Facility: CLINIC | Age: 40
End: 2025-01-17

## 2025-01-22 ENCOUNTER — OFFICE VISIT (OUTPATIENT)
Dept: INTERNAL MEDICINE CLINIC | Facility: CLINIC | Age: 40
End: 2025-01-22
Payer: COMMERCIAL

## 2025-01-22 VITALS
HEART RATE: 94 BPM | BODY MASS INDEX: 30.45 KG/M2 | SYSTOLIC BLOOD PRESSURE: 108 MMHG | RESPIRATION RATE: 16 BRPM | HEIGHT: 67 IN | WEIGHT: 194 LBS | DIASTOLIC BLOOD PRESSURE: 68 MMHG

## 2025-01-22 DIAGNOSIS — E66.9 OBESITY WITH BODY MASS INDEX (BMI) OF 35.0 TO 39.9 WITHOUT COMORBIDITY: ICD-10-CM

## 2025-01-22 DIAGNOSIS — Z51.81 THERAPEUTIC DRUG MONITORING: Primary | ICD-10-CM

## 2025-01-22 DIAGNOSIS — E78.6 LOW HDL (UNDER 40): ICD-10-CM

## 2025-01-22 DIAGNOSIS — R73.03 PREDIABETES: ICD-10-CM

## 2025-01-22 DIAGNOSIS — F43.9 STRESS: ICD-10-CM

## 2025-01-22 PROCEDURE — 99214 OFFICE O/P EST MOD 30 MIN: CPT | Performed by: NURSE PRACTITIONER

## 2025-01-22 PROCEDURE — 3078F DIAST BP <80 MM HG: CPT | Performed by: NURSE PRACTITIONER

## 2025-01-22 PROCEDURE — 3074F SYST BP LT 130 MM HG: CPT | Performed by: NURSE PRACTITIONER

## 2025-01-22 PROCEDURE — 3008F BODY MASS INDEX DOCD: CPT | Performed by: NURSE PRACTITIONER

## 2025-01-22 RX ORDER — TIRZEPATIDE 12.5 MG/.5ML
12.5 INJECTION, SOLUTION SUBCUTANEOUS WEEKLY
Qty: 2 ML | Refills: 2 | Status: SHIPPED | OUTPATIENT
Start: 2025-01-22 | End: 2025-02-13

## 2025-01-22 RX ORDER — MULTIVIT-MIN/IRON FUM/FOLIC AC 7.5 MG-4
1 TABLET ORAL DAILY
COMMUNITY

## 2025-01-22 NOTE — PATIENT INSTRUCTIONS
Next steps:  1.  Fill your prescribed medication and take as discussed and prescribed: zepbound 12.5mg weekly   2.  Schedule a personal nutrition consultation with one of our registered dieticians     Please try to work on the following dietary changes:  Daily protein recommendation to start:  grams  Daily carbohydrate: <120g  Daily calories: 1,400  1.  Drink water with meals and throughout the day, cut down on soda and/or juice if consumed. Consider flavored water options like Bubbly, Spindrift, Hint and [place].  2.  Eat breakfast daily and focus on having protein with each meal, examples include: greek yogurt, cottage cheese, hard boiled egg, whole grain toast with peanut butter.   3.  Reduce refined carbohydrates and sugars which includes items such as sweets, as well as rice, pasta, and bread and make sure to choose whole grain options when having them with just 1 serving per meal about the size of your inner palm.  4.  Consume non starchy veggies daily working towards making them a good 50% of your daily food intake. Add them to lunch and dinner consistently.  5.  Start a daily probiotic: VSL#3 is recommended, (order on line at www.vsl3.com). Take 1 capsule daily with water for 30 days, then reduce to 1 every other day (this will reduce the cost). Capsules can be left out for 2 weeks, but then must be refrigerated.      Please download teresa My Fitness Pal, LoseIt! Or Net Diary to monitor daily dietary intake and you will be able to see if you are eating the right amount of calories or too much or too little which would hinder weight loss. Additionally this will help to see your daily carbohydrate and protein intake. When you set the teresa up choose 1-2 lbs/week as a goal.  Keeping a paper food journal is an option as well to remain accountable for your choices- this is the start to mindful eating! A low calorie diet has been consistently shown to support weight loss.     Continue or start exercising to help  establish a routine. If not already exercising begin with 1 day and progress as able with long-term goal of 30 minutes 5 days a week at a minimum.     Meditation daily can help manage and control stress. Chronic stress can make weight loss difficult.  Exercising is one way to help with stress, but meditation using the CALM Mendel or another comparable alternative can be done in your home or place of work with little time commitment. This Mendel can also help work on behavior change and improve sleep. Check out the segment under Calm Masterclass and listen to The 4 Pillars of Health. A great way to begin learning about the foundation of lifestyle with practical tips to use in your every day.     Check out www.yourweightmatters.org blog for continued daily support and education along this weight loss journey!    Patient Resources:     Personal Training/Fitness Classes/Health Coaching     [facility] @ [email or web address] Full fitness center with group fitness and personal training. Discount available as client of [facility].  Health Coaching and Personal Training with Lady Paez at our [place] [facility]- individual weekly coaching with option to add personal training and small group fitness classes targeted at weight loss- [phone] and/or email @ [email or web address]  [employer] [place] [email or web address] Group Fitness [phone] and/or email Selena at [email or web address]  [facility] Fitness Centers with multiple locations: Interbank FX (www.Whatever), Eat The [employer] Fitness (www.iexerci.se.Herrenschmiede), Fit Body Bootcamp (www.HobobebodybootProtea Medicalp.Herrenschmiede), Nano Magnetics (www.Datanyze.Herrenschmiede), The Exercise  (www.exercisecoach.Herrenschmiede)     Online Fitness  Fitness  on Utube  Fit in 10 DVD series- www.jbepz31HQA.com  Sit and Be Fit - Chair exercise series Www.sitandbefit.org  Hip Hop  Fit with Gene Moreno at www.hiphopfit.net     Apps for on the Go Fitness  Bryant 7 Minute Workout (orange box with white 7) - free on the go HIIT training mendel  Peloton Mendel @ www.onepeloton.com     Nutrition Trackers and Tools  LoseIT! And My Fitness Pal apps and on line for tracking nutrition  NOOM - virtual health coaching  FitFoundation (healthy meals on the go) in [place] @ www.lmglesfdxdpit6l.StarChase  Bistro MD @ [employer] Intelligencebistromd.com and Eyknqx22 (keto and low carb plans recommended) @ www.wjpyze73.com, Metabolic Meals @ www.MyMetabolicMeals.com - individual prepared meals to go  Gobble, Blue Apron, Home , Every Plate, Sunbasket- on line meal delivery programs for preparation at home  Meal Village in [place] for homemade meals to go @ www.mealvillage.StarChase  Diet Doctor @ www.dietdoctor.StarChase - low carb swaps  YuSpanning Cloud Apps - meal prep and planning mendel (www.yummly.com)     Stress Management/Behavior/Mindful Eating  CALM meditation mendel (www.calm.com)  Headspace  Am I Hungry? Mindful eating virtual  mendel  Www.yourweightmatters.org - Obesity Action Coalition sponsored Blog posts daily  Motivation mendel (black box with white \")- daily supportive messages sent to your phone     Books/Video Education/Podcasts  Mindless Eating by Celso Shaikh  Why We Get Sick by Harpreet Worrell (a book about insulin resistance)  Atomic Habits by Cedric Walker (a book about taking small steps to promote greater behavior change)   Can't Hurt Me by Crow Bautista (a book exploring the power of discipline in achieving your goals)  The End of Dieting: How to Live for Life by Dr. Elian Metzger M.D. or listen to The VIP Parking Podcast Episode 63: Understanding \"Nutritarian\" Eating w/Dr. Elian Metzger  Your Body in Balance: The New Science of Food, Hormones, and Health by Dr. Faustino Salinas  The Menopause Diet Plan by Marifer Guerra and Candi Hooker  The Complete Guide to fasting by Dr. Sal  Sugar, Salt & Fat by Cate Enrique, Ph.D, R.D.  Weight Loss  Surgery Will Not Treat Food Addiction by Merline Boyer Ph.D  The Game Changers- LP Aminaix Documentary on plant based nutrition  Fed Up - documentary about obesity (Free on Utube)  The Truth About Sugar - documentary on sugar (Free on Utube, https://youAdwantedu.be/9J1fumxMD5s)  The Dr. Hsieh T5 Wellness Plan by Dr. Joseph Hsieh MD  Fitlosophy Fitspiration - journal to better health (found at [employer] in fitness aisle)  What Happened to You?- a look at the impact trauma has on behavior written by Mari Madden and Dr. Neaem David  Whole Again by Fritz Subramanian - discovering your true self after trauma  Rolando Herrera talk on [employer], The Call to Courage  Podcasts: The Exam Room by the Physician's Committee, Nutrition Facts by Dr. Orantes    We are here to support you with weight loss, but please remember that you still need your primary care provider for your routine health maintenance.

## 2025-02-05 ENCOUNTER — OFFICE VISIT (OUTPATIENT)
Dept: FAMILY MEDICINE CLINIC | Facility: CLINIC | Age: 40
End: 2025-02-05
Payer: COMMERCIAL

## 2025-02-05 VITALS
DIASTOLIC BLOOD PRESSURE: 70 MMHG | BODY MASS INDEX: 30.45 KG/M2 | SYSTOLIC BLOOD PRESSURE: 120 MMHG | TEMPERATURE: 98 F | OXYGEN SATURATION: 99 % | HEART RATE: 81 BPM | RESPIRATION RATE: 16 BRPM | WEIGHT: 194 LBS | HEIGHT: 67 IN

## 2025-02-05 DIAGNOSIS — E04.1 THYROID NODULE: ICD-10-CM

## 2025-02-05 DIAGNOSIS — Z79.899 MEDICATION MANAGEMENT: ICD-10-CM

## 2025-02-05 DIAGNOSIS — N63.31 MASS OF AXILLARY TAIL OF RIGHT BREAST: Primary | ICD-10-CM

## 2025-02-05 DIAGNOSIS — N60.02 BREAST CYST, LEFT: ICD-10-CM

## 2025-02-05 PROCEDURE — 3008F BODY MASS INDEX DOCD: CPT | Performed by: FAMILY MEDICINE

## 2025-02-05 PROCEDURE — 3074F SYST BP LT 130 MM HG: CPT | Performed by: FAMILY MEDICINE

## 2025-02-05 PROCEDURE — 99214 OFFICE O/P EST MOD 30 MIN: CPT | Performed by: FAMILY MEDICINE

## 2025-02-05 PROCEDURE — 3078F DIAST BP <80 MM HG: CPT | Performed by: FAMILY MEDICINE

## 2025-02-05 NOTE — PROGRESS NOTES
[facility] Visit Note  2/5/2025      Subjective:      Patient ID: Clau Santamaria is a 39 year old female.    Chief Complaint:  Chief Complaint   Patient presents with    Lump     States she has a lump in her right armpit that she first noticed about 5 months ago that has gotten bigger.       HPI:  Clau Santamaria is a 39 year old female who is being seen today for the above.      Lump-  lump in her right armpit that she first noticed in jan 2024 but didn't say anything as was going to have her mammogram soon. It has gotten bigger over the last 5 months.  Has lost weight, so unsure if that's why it seems bigger. Tender if squeezed. Last mammogram, last year had L breast cysts.  + breast cancer in her mother and Mgma.      Thyroid nodule- when was last checked it was not a size to biopsy. Is on zepbound now and worries about medullary thyroid cancer. Requesting if can be rechecked.    Review of Systems - as stated above in the HPI      Objective:     Vitals:    02/05/25 1204   BP: 120/70   Pulse: 81   Resp: 16   Temp: 97.9 °F (36.6 °C)   TempSrc: Temporal   SpO2: 99%   Weight: 194 lb (88 kg)   Height: 5' 7\" (1.702 m)       Physical Examination   General:  Alert, in no acute distress  HEENT: NCAT, EOMI, mucus membranes moist   Neck:  No cervical lymphadenopathy, mild thyroid fullness  CV: Regular rate and rhythm. No murmurs, gallops, or rubs.  Lungs:  Clear to auscultation B, no wheezes, rales, or rhonchi, normal respiratory effort  Abd:  +bowel sounds, soft  Ext:  No pedal edema,  Pedal pulses 2+ B  Breasts: breast appear normal, 0.5 x 0.75cm tender lump of the R axilla, no skin or nipple changes/discharge. No supraclavicular or axillary nodes. Declined chaperone        Assessment:     1. Mass of axillary tail of right breast  - [facility] MATT 2D+3D DIAGNOSTIC [facility]  BILAT (CPT=77066/52370); Future  - US BREAST RIGHT COMPLETE (CPT=76641); Future    2. Breast cyst, left  - [facility] MATT 2D+3D DIAGNOSTIC [facility]   BILAT (CPT=77066/88228); Future  - US BREAST LEFT COMPLETE (CPT=76641); Future    3. Thyroid nodule  - US THYROID (CPT=76536); Future    4. Medication management  - US THYROID (CPT=76536); Future          Return for annual physical when convenient with Dr. Lr, we will contact you with results.

## 2025-03-04 ENCOUNTER — HOSPITAL ENCOUNTER (OUTPATIENT)
Dept: ULTRASOUND IMAGING | Age: 40
Discharge: HOME | End: 2025-03-04
Attending: FAMILY MEDICINE
Payer: COMMERCIAL

## 2025-03-04 DIAGNOSIS — Z79.899 MEDICATION MANAGEMENT: ICD-10-CM

## 2025-03-04 DIAGNOSIS — E04.1 THYROID NODULE: ICD-10-CM

## 2025-03-04 PROCEDURE — 76536 US EXAM OF HEAD AND NECK: CPT | Performed by: FAMILY MEDICINE

## 2025-03-08 PROBLEM — E06.9 THYROIDITIS: Status: ACTIVE | Noted: 2021-09-23

## 2025-03-12 ENCOUNTER — HOSPITAL ENCOUNTER (OUTPATIENT)
Dept: MAMMOGRAPHY | Facility: HOSPITAL | Age: 40
Discharge: HOME | End: 2025-03-12
Attending: FAMILY MEDICINE
Payer: COMMERCIAL

## 2025-03-12 DIAGNOSIS — N63.31 MASS OF AXILLARY TAIL OF RIGHT BREAST: ICD-10-CM

## 2025-03-12 DIAGNOSIS — N60.02 BREAST CYST, LEFT: ICD-10-CM

## 2025-03-12 PROCEDURE — 77066 DX MAMMO INCL CAD BI: CPT | Performed by: FAMILY MEDICINE

## 2025-03-12 PROCEDURE — 76642 ULTRASOUND BREAST LIMITED: CPT | Performed by: FAMILY MEDICINE

## 2025-03-12 PROCEDURE — 77062 BREAST TOMOSYNTHESIS BI: CPT | Performed by: FAMILY MEDICINE

## 2025-03-12 NOTE — IMAGING NOTE
This Breast Care RN assisted Dr. THANH Oh with recommendation for a left breast 1 site ultrasound guided biopsy for mass. Procedure reviewed and all questions answered. Emotional and educational support given. On the day of the biopsy, pt instructed to take Tylenol 1000mg PO, eat a light meal & bring or wear a sports bra. Post biopsy care also reviewed with pt to include NO lifting more than 5lbs, no exercising or housework (limit upper body movement) for 24-48 hrs post biopsy. Patient denies blood thinners, bleeding disorders, liver disease and chemo. Pt verbalized understanding. Patient provided with appointment on Wednesday 3-19-25 at 1000, arrival at 0930 and confirmed with scheduling.

## 2025-03-19 ENCOUNTER — HOSPITAL ENCOUNTER (OUTPATIENT)
Dept: MAMMOGRAPHY | Facility: HOSPITAL | Age: 40
Discharge: HOME | End: 2025-03-19
Attending: FAMILY MEDICINE
Payer: COMMERCIAL

## 2025-03-19 DIAGNOSIS — N63.0 BREAST MASS: ICD-10-CM

## 2025-03-19 DIAGNOSIS — R92.8 ABNORMAL MAMMOGRAM: ICD-10-CM

## 2025-03-19 PROCEDURE — 88305 TISSUE EXAM BY PATHOLOGIST: CPT | Performed by: FAMILY MEDICINE

## 2025-03-19 PROCEDURE — 88341 IMHCHEM/IMCYTCHM EA ADD ANTB: CPT | Performed by: FAMILY MEDICINE

## 2025-03-19 PROCEDURE — 19083 BX BREAST 1ST LESION US IMAG: CPT | Performed by: FAMILY MEDICINE

## 2025-03-19 PROCEDURE — 88342 IMHCHEM/IMCYTCHM 1ST ANTB: CPT | Performed by: FAMILY MEDICINE

## 2025-03-19 PROCEDURE — 77065 DX MAMMO INCL CAD UNI: CPT | Performed by: FAMILY MEDICINE

## 2025-03-19 NOTE — DISCHARGE INSTRUCTIONS
Discharge Instructions  Stereotactic / Ultrasound / MRI Core Biopsy     Place an ice pack on top of the biopsy site in your bra OR the folds of the Ace wrap for 10-15 minutes every hour until bedtime for your comfort and to decrease bleeding.     Keep your supportive bra OR the Ace wrap in place for 24 hours after your biopsy. This compression decreases bleeding and breast movement for your comfort. Wear a supportive bra for the next couple days for comfort (as well as for sleeping)     No bath or shower during the first 24 hours after biopsy. After this time, you may take a bath or shower. It’s okay if the steri-strips get wet but don’t soak them.   No saunas, hot tubs, or swimming pools until the steri-strips fall off (5-7days).  This prevents infection and allows time for the area to completely close and heal.     DO NOT remove the steri-strips. They will fall off in 5 days to two weeks. If any type of irritation (redness, itching, OR blisters) develops in the area around the steri-strips, remove them gently. Keep the area clean and dry.     It is normal to have mild discomfort and bruising at the biopsy site.      You may take Tylenol as needed for discomfort.     DO NOT participate in strenuous activity (aerobics, heavy lifting, housework, gardening, etc.) 48 hours after your biopsy to prevent bleeding.     You will receive results typically within 2-3 business days. Occasionally, the specimen is sent off-site for a 2nd opinion. You will be notified when this occurs as this will delay your results.     If you have any questions about the procedure or your results, please contact the Breast Care Coordinator Nurse at [phone]. (M-F 7:30-4)    If you are having a MRI Breast Biopsy or an Ultrasound guided biopsy, you will be billed for the biopsy and a unilateral mammogram separately.    A 6-month or one year follow-up Diagnostic Mammogram/Ultrasound will be recommended to document stability of the biopsy  site. It may be scheduled at [facility] or [facility] by calling [phone]. You will need an order for this exam from your referring physician.       5/2024

## 2025-03-21 DIAGNOSIS — D24.2 INTRADUCTAL PAPILLOMA OF LEFT BREAST: Primary | ICD-10-CM

## 2025-03-21 NOTE — IMAGING NOTE
1234: Spoke with Clau Santamaria post ultrasound guided left breast biopsy.  Introduced myself as one of the breast nurse coordinators at [facility] and informed Ms. Santamaria of the purpose of my call.  Name and date of birth verified by patient.    Clau Santamaria confirmed awareness of breast pathology results as below-MyChart notification and provider conversation.    Pathology results and recommendations discussed as follows:   Final Diagnosis:   Breast, left, mass at 3:00, ultrasound core biopsies:  -Multiple fragments of intraductal papilloma with columnar cell change.  -Largest fragment measures approximately 10.0 mm (1.0 cm).   See EMR for complete pathology report    Per Dr. Garcia-Pathology from the left breast biopsy shows the presence of a papilloma.  This is concordant with the imaging assessment and is marked with a butterfly clip in appropriate position.  In the preprocedural imaging, this mass measured 1.1 cm.  Surgical consultation is recommended.    Dr. Corwin Lr referring to Dr. Renetta Rangel- see epic note  Ms. Santamaria instructed to make an appointment with Dr. Rangel-office phone number provided.  Clau Snatamaria encouraged to contact the breast center or Dr. Lr's office if she has questions or concerns prior to her appointment with Dr. Claire Santamaria verbalized understanding and agreement to the above.    Reinforced post biopsy care and instruction.  Clau Santamaria denies any issues with biopsy site- bleeding, drainage, redness, tenderness.

## 2025-03-21 NOTE — PROGRESS NOTES
I called the patient and discussed about intraductal papilloma finding.  We reviewed that it is benign but considered to be a precursor lesion for possible malignancy in the future.  I have referred her to see Dr. Rangel to discuss management/surgical option.  All questions were answered and patient advised to call my office if she has any further questions or needs assistance with setting up an appointment with breast surgeon

## 2025-04-01 ENCOUNTER — TELEMEDICINE (OUTPATIENT)
Dept: INTERNAL MEDICINE CLINIC | Facility: CLINIC | Age: 40
End: 2025-04-01
Payer: COMMERCIAL

## 2025-04-01 VITALS — HEIGHT: 67 IN | WEIGHT: 187 LBS | BODY MASS INDEX: 29.35 KG/M2

## 2025-04-01 DIAGNOSIS — F43.9 STRESS: ICD-10-CM

## 2025-04-01 DIAGNOSIS — E78.6 LOW HDL (UNDER 40): ICD-10-CM

## 2025-04-01 DIAGNOSIS — R73.03 PREDIABETES: ICD-10-CM

## 2025-04-01 DIAGNOSIS — E66.9 OBESITY WITH BODY MASS INDEX (BMI) OF 35.0 TO 39.9 WITHOUT COMORBIDITY: ICD-10-CM

## 2025-04-01 DIAGNOSIS — Z51.81 THERAPEUTIC DRUG MONITORING: Primary | ICD-10-CM

## 2025-04-01 PROCEDURE — 98005 SYNCH AUDIO-VIDEO EST LOW 20: CPT | Performed by: NURSE PRACTITIONER

## 2025-04-01 NOTE — PROGRESS NOTES
[facility] WEIGHT MANAGEMENT VIRTUAL ENCOUNTER     Clau Santamaria verbally consents to a Virtual/Telephone Check-In service on 04/01/25   Patient understands and accepts financial responsibility for any deductible, co-insurance and/or co-pays associated with this service.    HISTORY OF PRESENT ILLNESS  Chief Complaint   Patient presents with    Other     F/u on weight management      Clau Santamaria is a 40 year old female is being evaluated as a video visit using Telemedicine with live, interactive video and audio    Weight gain/loss since LOV based on home monitoring:   Home scale: 187 lbs  Has lost  #7 lbs since LOV 2.5 months ago     Compliance with medication: zepbound 12.5mg weekly   Tolerating well, helping with decreasing appetite and no side effects     Feels like she needs to workout more   Success: getting out walking more  Challenging: injection issue  Currently medication, had some issues with diarrhea last night but potentially related to the food that she was eating  Exercise/Activity: <1x/ week, via walking, not doing anything routine as far as exercise  Nutrition: eating regular meals, +protein, minimal veggies. not tracking reports  Stress is manageable- 5/10   Sleep: 6 hours/night, waking up feeling rested    Denies chest pain, shortness of breath, dizziness, blurred vision, headache, paresthesia, nausea/vomiting.     Wt Readings from Last 6 Encounters:   02/05/25 194 lb (88 kg)   01/22/25 194 lb (88 kg)   12/02/24 197 lb 3.2 oz (89.4 kg)   10/17/24 198 lb (89.8 kg)   04/22/24 220 lb (99.8 kg)   02/09/24 234 lb (106.1 kg)          Subjective  REVIEW OF SYSTEMS  GENERAL HEALTH: feels well otherwise, denied any fevers chills or night sweats   RESPIRATORY: denies shortness of breath   CARDIOVASCULAR: denies chest pain  GI: denies abdominal pain  PSYCH: denies any mood changes    Objective  EXAM  Reviewed most recent set of vitals   Physical Exam:  GENERAL: well developed, well  nourished, in no apparent distress, speaking in full sentences comfortably   SKIN: warm, pink, dry without rashes to exposed area   EYES: conjunctiva pink  HEENT: atraumatic, normocephalic  LUNGS: normal work of breathing, non labored  CARDIO: normal work, no exertion  EXTREMITIES: no cyanosis, no clubbing, no edema  NEURO: Oriented times three  PSYCH: pleasant, cooperative, normal mood and affect    Lab Results   Component Value Date    WBC 3.9 (L) 12/02/2024    RBC 4.11 12/02/2024    HGB 12.2 12/02/2024    HCT 36.7 12/02/2024    MCV 89.3 12/02/2024    MCH 29.7 12/02/2024    MCHC 33.2 12/02/2024    RDW 13.7 12/02/2024    .0 12/02/2024     Lab Results   Component Value Date    GLU 91 12/02/2024    BUN 15 12/02/2024    BUNCREA 12.9 07/23/2020    CREATSERUM 0.75 12/02/2024    ANIONGAP 3 12/02/2024    GFRNAA 115 09/28/2021    GFRAA 133 09/28/2021    CA 9.3 12/02/2024    OSMOCALC 286 12/02/2024    ALKPHO 23 (L) 12/02/2024    AST 13 12/02/2024    ALT 10 12/02/2024    BILT 0.5 12/02/2024    TP 7.2 12/02/2024    ALB 4.4 12/02/2024    GLOBULIN 2.8 12/02/2024     12/02/2024    K 3.8 12/02/2024     12/02/2024    CO2 23.0 12/02/2024     Lab Results   Component Value Date     12/02/2024    A1C 5.1 12/02/2024     Lab Results   Component Value Date    CHOLEST 153 12/02/2024    TRIG 109 12/02/2024    HDL 48 12/02/2024    LDL 85 12/02/2024    VLDL 17 12/02/2024    NONHDLC 105 12/02/2024     Lab Results   Component Value Date    T4F 1.3 12/02/2024    TSH 1.595 12/02/2024     No results found for: \"B12\", \"VITB12\"  No results found for: \"VITD\", \"QVITD\", \"TCEC38WG\"    Medications Ordered Prior to Encounter[1]    ASSESSMENT  Analyzed weight data:       Diagnoses and all orders for this visit:    Therapeutic drug monitoring    Obesity with body mass index (BMI) of 35.0 to 39.9 without comorbidity    Stress    Low HDL (under 40)    Prediabetes        PLAN  Continue with medications: zepbound 12.5mg weekly -will  stay at this dose  --advised of side effects and adverse effects of this medication  Contradictions: none, pre-glucoma ocular hypertension, per patient resolved, stopped due phentermine due to side effects    Reviewed labs  HLD  Stable, (low HDL)- encouraged exercise, follows with PCP  Hx of prediabetes, reviewed last A1c 5.1% on 12/2024  encourage physical exercise and strength training   Wrote out macros and encouraged tracking  Advised to monitor blood pressure and pulse at home/ given parameters to review and contact provider.  Nutrition: low carb diet/ recommended to eat breakfast daily/ regular protein intake  Follow up with dietitian and psychologist as recommended.  Discussed the role of sleep and stress in weight management.  Counseled on comprehensive weight loss plan including attention to nutrition, exercise and behavior/stress management for success. See patient instruction below for more details.  Discussed strategies to overcome barriers to successful weight loss and weight maintenance  FITTE: ACSM recommendations (150-300 minutes/ week in active weight loss)       There are no Patient Instructions on file for this visit.    No follow-ups on file.    Patient verbalizes understanding.    Total time spent on chart review, pre-charting, obtaining history, counseling, and educating, reviewing labs was 23 minutes.       Pt understands phone/video evaluation is not a substitute for face to face examination or emergency care. Pt advised to go to the ER or call 911 for worsening symptoms or acute distress.       Please note that the following visit was completed using two-way, real-time interactive audio and/or video communication.  This has been done in good constance to provide continuity of care in the best interest of the provider-patient relationship, due to the ongoing public health crisis/national emergency and because of restrictions of visitation.  There are limitations of this visit as no physical exam  could be performed.  Every conscious effort was taken to allow for sufficient and adequate time.  This billing was spent on reviewing labs, medications, radiology tests and decision making.  Appropriate medical decision-making and tests are ordered as detailed in the plan of care above.     NOTE TO PATIENT: The 21st Century Cures Act makes clinical notes like these available to patients in the interest of transparency. Clinical notes are medical documents used by physicians and care providers to communicate with each other. These documents include medical language and terminology, abbreviations, and treatment information that may sound technical and at times possibly unfamiliar. In addition, at times, the verbiage may appear blunt or direct. These documents are one tool providers use to communicate relevant information and clinical opinions of the care providers in a way that allows common understanding of the clinical context.     Jenna Adan, APRN  4/1/2025       [1]   Current Outpatient Medications on File Prior to Visit   Medication Sig Dispense Refill    Multiple Vitamins-Minerals (MULTI-VITAMIN/MINERALS) Oral Tab Take 1 tablet by mouth daily.      tranexamic acid (LYSTEDA) 650 MG Oral Tab Take 2 tablets (1,300 mg total) by mouth every 8 (eight) hours. For up to 5 days every month with menses 90 tablet 2    Fluticasone Propionate 50 MCG/ACT Nasal Suspension SPRAY 1 to 2 SPRAYS INTO EACH NOSTRIL EVERY DAY        No current facility-administered medications on file prior to visit.

## 2025-04-01 NOTE — PATIENT INSTRUCTIONS
Next steps:  1.  Fill your prescribed medication and take as discussed and prescribed: zepbound 12.5mg weekly   2.  Schedule a personal nutrition consultation with one of our registered dieticians     Please try to work on the following dietary changes:    1.  Drink water with meals and throughout the day, cut down on soda and/or juice if consumed. Consider flavored water options like Bubbly, Spindrift, Hint and India.  2.  Eat breakfast daily and focus on having protein with each meal, examples include: greek yogurt, cottage cheese, hard boiled egg, whole grain toast with peanut butter.   3.  Reduce refined carbohydrates and sugars which includes items such as sweets, as well as rice, pasta, and bread and make sure to choose whole grain options when having them with just 1 serving per meal about the size of your inner palm.  4.  Consume non starchy veggies daily working towards making them a good 50% of your daily food intake. Add them to lunch and dinner consistently.  5.  Start a daily probiotic: VSL#3 is recommended, (order on line at www.vsl3.com). Take 1 capsule daily with water for 30 days, then reduce to 1 every other day (this will reduce the cost). Capsules can be left out for 2 weeks, but then must be refrigerated.      Please download teresa My Fitness Pal, LoseIt! Or Net Diary to monitor daily dietary intake and you will be able to see if you are eating the right amount of calories or too much or too little which would hinder weight loss. Additionally this will help to see your daily carbohydrate and protein intake. When you set the teresa up choose 1-2 lbs/week as a goal.  Keeping a paper food journal is an option as well to remain accountable for your choices- this is the start to mindful eating! A low calorie diet has been consistently shown to support weight loss.     Continue or start exercising to help establish a routine. If not already exercising begin with 1 day and progress as able with long-term  goal of 30 minutes 5 days a week at a minimum.     Meditation daily can help manage and control stress. Chronic stress can make weight loss difficult.  Exercising is one way to help with stress, but meditation using the CALM Mendel or another comparable alternative can be done in your home or place of work with little time commitment. This Mendel can also help work on behavior change and improve sleep. Check out the segment under Calm Masterclass and listen to The 4 Pillars of Health. A great way to begin learning about the foundation of lifestyle with practical tips to use in your every day.     Check out www.yourweightmatters.org blog for continued daily support and education along this weight loss journey!    Patient Resources:     Personal Training/Fitness Classes/Health Coaching     [facility] @ [email or web address] Full fitness center with group fitness and personal training. Discount available as client of [facility].  Health Coaching and Personal Training with Lady Paez at our [facility]- individual weekly coaching with option to add personal training and small group fitness classes targeted at weight loss- [phone] and/or email @ [email or web address]  360FIT [place] [email or web address] Group Fitness [phone] and/or email [email or web address]  [facility] Fitness Centers with multiple locations: iRise (www.Local Marketers), Eat The [place] Energy Fitness (www.Centaur.Capigami), Fit Body Bootcamp (www.MatchabodybootOntelap.Capigami), Tiller Fitness (www.Project 10K), The Exercise  (www.exercisecoach.Capigami)     Online Fitness  Fitness  on Utube  Fit in 10 DVD series- www.iybvv53GKF.com  Sit and Be Fit - Chair exercise series Www.sitandbefit.org  Hip Hop Fit with Pino Moreno at www.hiphopfit.net     Apps for on the Go Fitness  [place] 7 Minute Workout  (orange box with white 7) - free on the go HIIT training mendel  Peloton Mendel @ www.onepeloton.com     Nutrition Trackers and Tools  LoseIT! And My Fitness Pal apps and on line for tracking nutrition  NOOM - virtual health coaching  FitFoundation (healthy meals on the go) in [place] @ www.pgeglawybmygj8x.BillGuard  Bistro MD @ www.bistromd.com and Cdognh74 (keto and low carb plans recommended) @ www.nkzgji75.com, Metabolic Meals @ www.Linux NetworxMetabolicMeals.com - individual prepared meals to go  Gobble, Blue Apron, Home , Every Plate, Sunbasket- on line meal delivery programs for preparation at home  Meal Village in [place] for homemade meals to go @ www.mealvillage.BillGuard  Diet Doctor @ www.dietdoctor.com - low carb swaps  Yummly - meal prep and planning mendel (www.yummly.com)     Stress Management/Behavior/Mindful Eating  CALM meditation mendel (www.calm.com)  Headspace  Am I Hungry? Mindful eating virtual  mendel  Www.yourweightmatters.org - Obesity Action Coalition sponsored Blog posts daily  Motivation mendel (black box with white \")- daily supportive messages sent to your phone     Books/Video Education/Podcasts  Mindless Eating by Celso Shaikh  Why We Get Sick by Harpreet Worrell (a book about insulin resistance)  Atomic Habits by Cedric Walker (a book about taking small steps to promote greater behavior change)   Can't Hurt Me by Crow Bautista (a book exploring the power of discipline in achieving your goals)  The End of Dieting: How to Live for Life by Dr. Elian Metzger M.D. or listen to The Joystickers Podcast Episode 63: Understanding \"Nutritarian\" Eating w/Dr. Elian Metzger  Your Body in Balance: The New Science of Food, Hormones, and Health by Dr. Faustino Salinas  The Menopause Diet Plan by Marifer Guerra and Candi Hooker  The Complete Guide to fasting by Dr. Sal  Sugar, Salt & Fat by Cate Enrique, Ph.D, R.D.  Weight Loss Surgery Will Not Treat Food Addiction by Merline Boyer Ph.D  The Game Changers- NetAbaad [employer]  Documentary on plant based nutrition  Fed Up - documentary about obesity (Free on Utube)  The Truth About Sugar - documentary on sugar (Free on Utube, https://youtu.be/4K0fzyeDA1f)  The Dr. Hsieh T5 Wellness Plan by Dr. Joseph Hsieh MD  Fitlosophy Fitspiration - journal to better health (found at [employer] in fitness aisle)  What Happened to You?- a look at the impact trauma has on behavior written by Mari Madden and Dr. Naeem David  Whole Again by Fritz Subramanian - discovering your true self after trauma  Rolando Herrera talk on FlowCardia, The Call to Courage  Podcasts: The Exam Room by the Physician's Committee, Nutrition Facts by Dr. Orantes    We are here to support you with weight loss, but please remember that you still need your primary care provider for your routine health maintenance.

## 2025-04-21 RX ORDER — TIRZEPATIDE 12.5 MG/.5ML
INJECTION, SOLUTION SUBCUTANEOUS
Qty: 2 ML | Refills: 2 | Status: SHIPPED | OUTPATIENT
Start: 2025-04-21

## 2025-04-21 NOTE — CONSULTS
Breast Surgery New Patient Consultation    Clau Santamaria is a 40 year old patient, referred by Dr. Crews, a patient of Dr. Lr, who presents for evaluation of left breast papilloma.    History of Present Illness:   Ms. Clau Santamaria is a 40 year old woman who presents for evaluation of left breast papilloma.     She underwent diagnostic mammogram on 3/12/2025 due to breast mass.  She has density C breast tissue.  In the left breast, 3 o'clock position, 9 cm from the nipple, there was a lesion measuring 1.1 cm.  There was a simple cyst in the right axilla at the site of the patient's palpable concern, measuring up to 4 mm.  Biopsy was recommended for the left breast lesion and this was performed on 3/19/2025, butterfly clip was placed.  Pathology showed multiple fragments of intraductal papilloma.  This was concordant with the imaging assessment.    She denies any palpable masses, nipple discharge, skin changes, or axillary adenopathy. She does not have breast pain. She does not have significant past history for breast diseases or breast biopsy. She does have family history of breast cancer.  Her mother had breast cancer in her 40s.  Her maternal grandmother had breast cancer in her 30s.  Her father has a history of sarcoma.  The patient does not have a history of genetic testing.          Past Medical History[1]    Past Surgical History[2]    Gynecological History:  Menarche at age 11 and LMP 4/15/2025  Pt is a   Pt was 28 years old at time of first pregnancy.    She has cumulative breastfeeding history of 42 months.  Age of Menopause: n/a  Type: n/a  She denies any history of hormone replacement therapy  She denies any history of oral contraceptive use   She has recieved infertility treatment to achieve pregnancy.    Medications:    Current Medications[3]    Allergies:    Allergies[4]    Family History:   Family History[5]    She is not of Ashkenazi Alevism ancestry.    Social  History:  History   Alcohol Use    Yes     Comment: OCCASIONALLY       History   Smoking Status    Never   Smokeless Tobacco    Never       Review of Systems:    Review of Systems   Constitutional:  Negative for chills and fever.   HENT:  Negative for sore throat and trouble swallowing.    Eyes:  Negative for visual disturbance.   Respiratory:  Negative for cough, chest tightness and shortness of breath.    Cardiovascular:  Negative for chest pain, palpitations and leg swelling.   Gastrointestinal:  Negative for nausea, vomiting, abdominal pain, diarrhea, constipation and blood in stool.   Genitourinary:  Negative for dysuria, hematuria and difficulty urinating.   Skin:  Negative for rash.   Neurological:  Negative for numbness and headaches.      Otherwise as per HPI.    Physical Exam:    [facility] 01/25/2025 (Exact Date)     Physical Exam  Vitals reviewed.   Constitutional:       Appearance: Normal appearance.   HENT:      Head: Normocephalic and atraumatic.   Eyes:      Extraocular Movements: Extraocular movements intact.      Pupils: Pupils are equal, round, and reactive to light.   Cardiovascular:      Rate and Rhythm: Normal rate.   Pulmonary:      Effort: Pulmonary effort is normal.   Chest:   Breasts:     Right: Normal. No mass, nipple discharge, skin change or tenderness.      Left: Normal. No mass, nipple discharge, skin change or tenderness.       Abdominal:      General: Abdomen is flat.      Palpations: Abdomen is soft.   Musculoskeletal:         General: Normal range of motion.      Cervical back: Normal range of motion and neck supple.   Lymphadenopathy:      Upper Body:      Right upper body: No supraclavicular or axillary adenopathy.      Left upper body: No supraclavicular or axillary adenopathy.   Skin:     General: Skin is warm and dry.   Neurological:      General: No focal deficit present.      Mental Status: She is alert and oriented to person, place, and time.   Psychiatric:         Mood and  Affect: Mood normal.          Bra size: 38DD (L)    Labs/imaging: reviewed in EPIC    Impression:   Ms. Clau Santamaria is a 40 year old woman that presents for left breast papilloma    Discussion and Plan:  I had a discussion with the Patient and Spouse regarding her breast exam. On exam today I found no evidence of disease besides biopsy site changes in the left breast. I personally reviewed her recent imaging and pathology and we discussed this.    We discussed intraductal papilloma.  Without atypia a papilloma has a about a 5% upgrade rate to a high risk lesion.  Usually papillomas without atypia are excised if palpable, causing symptoms such as bloody nipple discharge, greater than 1 cm, present in a patient over 55, or found on imaging as a mass and/or BI-RADS 4B/C lesion. At this time I do recommend excision. The patient is also motivated due to her family history.     The risks, benefits, and alternatives were discussed including, but not limited to, seroma development, infection, and bleeding. We also discussed the possibility for upstaging of pathology which would require further surgery on another day. We discussed this is an outpatient procedure, lifting restrictions, pain management, and return to work.    Plan: Left breast rom localized excisional biopsy   Localize: left breast, 3:00, 9 cm FN, butterfly clip, biopsy proven papilloma    Genetics number was provided.    She was given ample opportunity for questions and those questions were answered to her satisfaction. She has been encouraged to contact the office with any questions or concerns. This encounter lasted a total of 55 minutes, more than 50% of which was dedicated to the discussion of management options.     Renetta Rangel MD  Breast Surgical Oncology    CC: Dr. Crews          [1]   Past Medical History:   Benign thyroid cyst    Endometriosis    Thyroiditis   [2]   Past Surgical History:  Procedure Laterality Date    Removal of ovarian  cyst(s) Left     2014    Us breast biopsy 1 site left (cpt=19083) Left 03/19/2025    Intraductal papilloma (potentiallly pre-CA) to see Dr. Rangel   [3]   No outpatient medications have been marked as taking for the 4/23/25 encounter (Appointment) with Renetta Rangel MD.   [4]   Allergies  Allergen Reactions    Shellfish Allergy NAUSEA AND VOMITING    Amoxicillin RASH   [5]   Family History  Problem Relation Age of Onset    Breast Cancer Mother         age 40's    Cancer Mother     Diabetes Father     Cancer Father         sarcoma    Cancer Maternal Grandmother         BREAST    Breast Cancer Maternal Grandmother         age 30's    Heart Attack Maternal Grandfather     Heart Attack Paternal Grandmother

## 2025-04-21 NOTE — H&P (VIEW-ONLY)
Breast Surgery New Patient Consultation    Clau Santamaria is a 40 year old patient, referred by Dr. Crews, a patient of Dr. Lr, who presents for evaluation of left breast papilloma.    History of Present Illness:   Ms. Clau Santamaria is a 40 year old woman who presents for evaluation of left breast papilloma.     She underwent diagnostic mammogram on 3/12/2025 due to breast mass.  She has density C breast tissue.  In the left breast, 3 o'clock position, 9 cm from the nipple, there was a lesion measuring 1.1 cm.  There was a simple cyst in the right axilla at the site of the patient's palpable concern, measuring up to 4 mm.  Biopsy was recommended for the left breast lesion and this was performed on 3/19/2025, butterfly clip was placed.  Pathology showed multiple fragments of intraductal papilloma.  This was concordant with the imaging assessment.    She denies any palpable masses, nipple discharge, skin changes, or axillary adenopathy. She does not have breast pain. She does not have significant past history for breast diseases or breast biopsy. She does have family history of breast cancer.  Her mother had breast cancer in her 40s.  Her maternal grandmother had breast cancer in her 30s.  Her father has a history of sarcoma.  The patient does not have a history of genetic testing.          Past Medical History[1]    Past Surgical History[2]    Gynecological History:  Menarche at age 11 and LMP 4/15/2025  Pt is a   Pt was 28 years old at time of first pregnancy.    She has cumulative breastfeeding history of 42 months.  Age of Menopause: n/a  Type: n/a  She denies any history of hormone replacement therapy  She denies any history of oral contraceptive use   She has recieved infertility treatment to achieve pregnancy.    Medications:    Current Medications[3]    Allergies:    Allergies[4]    Family History:   Family History[5]    She is not of Ashkenazi Baptism ancestry.    Social  History:  History   Alcohol Use    Yes     Comment: OCCASIONALLY       History   Smoking Status    Never   Smokeless Tobacco    Never       Review of Systems:    Review of Systems   Constitutional:  Negative for chills and fever.   HENT:  Negative for sore throat and trouble swallowing.    Eyes:  Negative for visual disturbance.   Respiratory:  Negative for cough, chest tightness and shortness of breath.    Cardiovascular:  Negative for chest pain, palpitations and leg swelling.   Gastrointestinal:  Negative for nausea, vomiting, abdominal pain, diarrhea, constipation and blood in stool.   Genitourinary:  Negative for dysuria, hematuria and difficulty urinating.   Skin:  Negative for rash.   Neurological:  Negative for numbness and headaches.      Otherwise as per HPI.    Physical Exam:    [facility] 01/25/2025 (Exact Date)     Physical Exam  Vitals reviewed.   Constitutional:       Appearance: Normal appearance.   HENT:      Head: Normocephalic and atraumatic.   Eyes:      Extraocular Movements: Extraocular movements intact.      Pupils: Pupils are equal, round, and reactive to light.   Cardiovascular:      Rate and Rhythm: Normal rate.   Pulmonary:      Effort: Pulmonary effort is normal.   Chest:   Breasts:     Right: Normal. No mass, nipple discharge, skin change or tenderness.      Left: Normal. No mass, nipple discharge, skin change or tenderness.       Abdominal:      General: Abdomen is flat.      Palpations: Abdomen is soft.   Musculoskeletal:         General: Normal range of motion.      Cervical back: Normal range of motion and neck supple.   Lymphadenopathy:      Upper Body:      Right upper body: No supraclavicular or axillary adenopathy.      Left upper body: No supraclavicular or axillary adenopathy.   Skin:     General: Skin is warm and dry.   Neurological:      General: No focal deficit present.      Mental Status: She is alert and oriented to person, place, and time.   Psychiatric:         Mood and  Affect: Mood normal.          Bra size: 38DD (L)    Labs/imaging: reviewed in EPIC    Impression:   Ms. Clau Santamaria is a 40 year old woman that presents for left breast papilloma    Discussion and Plan:  I had a discussion with the Patient and Spouse regarding her breast exam. On exam today I found no evidence of disease besides biopsy site changes in the left breast. I personally reviewed her recent imaging and pathology and we discussed this.    We discussed intraductal papilloma.  Without atypia a papilloma has a about a 5% upgrade rate to a high risk lesion.  Usually papillomas without atypia are excised if palpable, causing symptoms such as bloody nipple discharge, greater than 1 cm, present in a patient over 55, or found on imaging as a mass and/or BI-RADS 4B/C lesion. At this time I do recommend excision. The patient is also motivated due to her family history.     The risks, benefits, and alternatives were discussed including, but not limited to, seroma development, infection, and bleeding. We also discussed the possibility for upstaging of pathology which would require further surgery on another day. We discussed this is an outpatient procedure, lifting restrictions, pain management, and return to work.    Plan: Left breast rom localized excisional biopsy   Localize: left breast, 3:00, 9 cm FN, butterfly clip, biopsy proven papilloma    Genetics number was provided.    She was given ample opportunity for questions and those questions were answered to her satisfaction. She has been encouraged to contact the office with any questions or concerns. This encounter lasted a total of 55 minutes, more than 50% of which was dedicated to the discussion of management options.     Renetta Rangel MD  Breast Surgical Oncology    CC: Dr. Crews          [1]   Past Medical History:   Benign thyroid cyst    Endometriosis    Thyroiditis   [2]   Past Surgical History:  Procedure Laterality Date    Removal of ovarian  cyst(s) Left     2014    Us breast biopsy 1 site left (cpt=19083) Left 03/19/2025    Intraductal papilloma (potentiallly pre-CA) to see Dr. Rangel   [3]   No outpatient medications have been marked as taking for the 4/23/25 encounter (Appointment) with Renetta Rangel MD.   [4]   Allergies  Allergen Reactions    Shellfish Allergy NAUSEA AND VOMITING    Amoxicillin RASH   [5]   Family History  Problem Relation Age of Onset    Breast Cancer Mother         age 40's    Cancer Mother     Diabetes Father     Cancer Father         sarcoma    Cancer Maternal Grandmother         BREAST    Breast Cancer Maternal Grandmother         age 30's    Heart Attack Maternal Grandfather     Heart Attack Paternal Grandmother

## 2025-04-21 NOTE — TELEPHONE ENCOUNTER
Requesting   Requested Prescriptions     Pending Prescriptions Disp Refills    ZEPBOUND 12.5 MG/0.5ML Subcutaneous Solution Auto-injector [Pharmacy Med Name: Zepbound Subcutaneous Solution Auto-injector 12.5 MG/0.5ML] 2 mL 0     Sig: Inject 0.5ML (12.5 mg) into the skin once a week for 4 doses.      LOV: 04/01/2025(tele)  RTC: 8/27/2025  Filled: 1/22/2025 #2 ml with 2 refills    Future Appointments   Date Time Provider Department Center   4/23/2025  2:00 PM Renetta Rangel MD [facility] [address]   5/16/2025 11:30 AM Corwin Lr DO [facility] [address] [facility] [address]   8/27/2025  9:40 AM Jenna Adan APRN [facility] [address]   11/7/2025  9:20 AM Jenna Adan APRN [facility] [address]

## 2025-04-23 ENCOUNTER — OFFICE VISIT (OUTPATIENT)
Facility: CLINIC | Age: 40
End: 2025-04-23
Payer: COMMERCIAL

## 2025-04-23 VITALS
DIASTOLIC BLOOD PRESSURE: 81 MMHG | HEART RATE: 72 BPM | SYSTOLIC BLOOD PRESSURE: 116 MMHG | WEIGHT: 194.88 LBS | OXYGEN SATURATION: 95 % | BODY MASS INDEX: 31 KG/M2

## 2025-04-23 DIAGNOSIS — D24.2 INTRADUCTAL PAPILLOMA OF LEFT BREAST: Primary | ICD-10-CM

## 2025-04-23 PROCEDURE — 3079F DIAST BP 80-89 MM HG: CPT | Performed by: SURGERY

## 2025-04-23 PROCEDURE — 3074F SYST BP LT 130 MM HG: CPT | Performed by: SURGERY

## 2025-04-23 PROCEDURE — 99205 OFFICE O/P NEW HI 60 MIN: CPT | Performed by: SURGERY

## 2025-04-23 NOTE — PATIENT INSTRUCTIONS
Dr. Renetta Rangel  Tel: [phone]  Fax: [phone] [place] [address]  [phone]     Surgery/Procedure: Left breast rom  localized excisional biopsy      Anesthesia:   MAC  Surgery Length:   45 minutes CPT:  29643   Wire LOC:   No   Nuc Med:   No   Rom Seed:  Yes     Dx & ICD-10: Intraductal papilloma of left breast [D24.2]      Radiology Instructions:  Left breast, 3 o'clock position, 9 cm from the nipple, butterfly clip, biopsy proven intraductal papilloma        Location (quadrant, o'clock, and cm from the nipple if included), clip shape (ie. Tophat, coil, 1, etc.), and pathology (ie. Biopsy confirmed...). (If pathology is discordant, write, \"biopsy proven___, discordant findings\")   _______________________________________________________________________________    Someone must accompany you the day of the procedure to drive you home safely, because of anesthesia.  You will need an adult  to stay with you the first night following your surgery.  You must remove any kind of makeup, acrylic nails, lotions, powders, creams or deodorant.  [facility] ONLY: Pre-admission will give instructions on when to take Gatorade and Tylenol/acetaminophen prior to your surgery, purchase 2 - 12oz bottles of regular Gatorade (NOT RED/SUGAR FREE). Otherwise, you may not eat or drink anything else after 11PM the night before surgery.    Wear comfortable clothing that can be easily removed.  If you wear dentures, contacts lenses, or any prosthesis, you will be asked to remove them.  Do not drink alcohol or smoke 24 hours prior to your procedure.  Bring a picture ID and your insurance card.  Covid-19 testing is no longer required before surgery unless you are experiencing symptoms such as fever, cough, congestion, etc.  The Pre-Admission Testing Department will call the day before to confirm your procedure, give you the time you need to arrive by and directions on where to go.  They begin making calls after 2pm, if you are not contacted by 4pm, please call the surgeon's office listed above.  Do not take any blood thinners at least one week prior to the procedure/surgery. This includes aspirin, baby aspirin, Motrin, Ibuprofen, herbal supplements, diet medications, vitamin E, fish oil and green tea supplements. Please check other supplements for these ingredients. *TYLENOL or acetaminophen is acceptable*  If you take Coumadin, Plavix, Xarelto, or Eliquis, please contact your prescribing physician for special instructions on how long to hold. If you take insulin contact your primary care physician for special instructions.  Our Surgery scheduler, Dipika, will be contacting you to discuss surgery dates. If you have any questions related to scheduling your surgery, please reach out to her at [phone].  _____________________________________________________________________  PRE-OPERATIVE TESTING IF INDICATED BELOW  Please Complete ASAP ( at least 14-21 days prior to surgery)  Please call Central Scheduling to schedule an appointment for pre-operative labs/tests @ [phone]  [x] CBC [] BMP [x] CMP [x] EKG    [] PT, PTT, INR [] Cardiac Clearance  [x] H&P Medical Clearance [] Chest X-ray            Does the patient have a pacemaker or ICD?                            [x]No  Does the patient have sleep apnea?                                                      [x] No

## 2025-04-24 ENCOUNTER — TELEPHONE (OUTPATIENT)
Dept: SURGERY | Facility: CLINIC | Age: 40
End: 2025-04-24

## 2025-04-24 DIAGNOSIS — D24.2 INTRADUCTAL PAPILLOMA OF LEFT BREAST: Primary | ICD-10-CM

## 2025-04-24 NOTE — IMAGING NOTE
1318: Spoke with Clau Santamaria.  Introduced myself as one of the breast nurse coordinators at [facility] and informed Ms. Santamaria of the purpose of my call-procedure education and scheduling.  Discussed rom  localization procedure requested by Dr. Rangel to be done in the [facility] PRIOR to surgery date-Thursday, May 22.   Procedure explained. Ms. Santamaria verbalized understanding. No questions at this time.  Clau Santamaria transferred to the breast center schedulers for an appointment.

## 2025-04-24 NOTE — TELEPHONE ENCOUNTER
Calling pt in regards to scheduling surgery.  Informed pt that I have 5/22/2025 available at [facility] with Dr. Rangel.  Pt verbalized understanding and in agreement with date and location.  All questions answered.   Encouraged pt to call or [employer] message office with any other questions or concerns.

## 2025-05-02 ENCOUNTER — LAB ENCOUNTER (OUTPATIENT)
Dept: LAB | Age: 40
End: 2025-05-02
Attending: FAMILY MEDICINE
Payer: COMMERCIAL

## 2025-05-02 ENCOUNTER — EKG ENCOUNTER (OUTPATIENT)
Dept: LAB | Age: 40
End: 2025-05-02
Attending: FAMILY MEDICINE
Payer: COMMERCIAL

## 2025-05-02 ENCOUNTER — OFFICE VISIT (OUTPATIENT)
Dept: FAMILY MEDICINE CLINIC | Facility: CLINIC | Age: 40
End: 2025-05-02
Payer: COMMERCIAL

## 2025-05-02 VITALS
TEMPERATURE: 97 F | HEART RATE: 92 BPM | OXYGEN SATURATION: 99 % | RESPIRATION RATE: 16 BRPM | BODY MASS INDEX: 30.13 KG/M2 | DIASTOLIC BLOOD PRESSURE: 68 MMHG | SYSTOLIC BLOOD PRESSURE: 102 MMHG | WEIGHT: 192 LBS | HEIGHT: 67 IN

## 2025-05-02 DIAGNOSIS — D64.9 LOW HEMOGLOBIN: ICD-10-CM

## 2025-05-02 DIAGNOSIS — Z01.818 PRE-OP EXAM: ICD-10-CM

## 2025-05-02 DIAGNOSIS — Z01.818 PRE-OP EXAM: Primary | ICD-10-CM

## 2025-05-02 LAB
ALBUMIN SERPL-MCNC: 4.7 G/DL (ref 3.2–4.8)
ALBUMIN/GLOB SERPL: 1.9 {RATIO} (ref 1–2)
ALP LIVER SERPL-CCNC: 26 U/L (ref 37–98)
ALT SERPL-CCNC: <7 U/L (ref 10–49)
ANION GAP SERPL CALC-SCNC: 10 MMOL/L (ref 0–18)
AST SERPL-CCNC: 13 U/L (ref ?–34)
ATRIAL RATE: 79 BPM
BASOPHILS # BLD AUTO: 0.01 X10(3) UL (ref 0–0.2)
BASOPHILS NFR BLD AUTO: 0.2 %
BILIRUB SERPL-MCNC: 0.6 MG/DL (ref 0.3–1.2)
BUN BLD-MCNC: 11 MG/DL (ref 9–23)
CALCIUM BLD-MCNC: 9.6 MG/DL (ref 8.7–10.6)
CHLORIDE SERPL-SCNC: 105 MMOL/L (ref 98–112)
CO2 SERPL-SCNC: 26 MMOL/L (ref 21–32)
CREAT BLD-MCNC: 0.71 MG/DL (ref 0.55–1.02)
EGFRCR SERPLBLD CKD-EPI 2021: 109 ML/MIN/1.73M2 (ref 60–?)
EOSINOPHIL # BLD AUTO: 0.12 X10(3) UL (ref 0–0.7)
EOSINOPHIL NFR BLD AUTO: 2.2 %
ERYTHROCYTE [DISTWIDTH] IN BLOOD BY AUTOMATED COUNT: 13.3 %
FASTING STATUS PATIENT QL REPORTED: YES
GLOBULIN PLAS-MCNC: 2.5 G/DL (ref 2–3.5)
GLUCOSE BLD-MCNC: 70 MG/DL (ref 70–99)
HCT VFR BLD AUTO: 35.3 % (ref 35–48)
HGB BLD-MCNC: 11.6 G/DL (ref 12–16)
IMM GRANULOCYTES # BLD AUTO: 0.01 X10(3) UL (ref 0–1)
IMM GRANULOCYTES NFR BLD: 0.2 %
LYMPHOCYTES # BLD AUTO: 1.61 X10(3) UL (ref 1–4)
LYMPHOCYTES NFR BLD AUTO: 29.7 %
MCH RBC QN AUTO: 28.9 PG (ref 26–34)
MCHC RBC AUTO-ENTMCNC: 32.9 G/DL (ref 31–37)
MCV RBC AUTO: 88 FL (ref 80–100)
MONOCYTES # BLD AUTO: 0.42 X10(3) UL (ref 0.1–1)
MONOCYTES NFR BLD AUTO: 7.7 %
NEUTROPHILS # BLD AUTO: 3.26 X10 (3) UL (ref 1.5–7.7)
NEUTROPHILS # BLD AUTO: 3.26 X10(3) UL (ref 1.5–7.7)
NEUTROPHILS NFR BLD AUTO: 60 %
OSMOLALITY SERPL CALC.SUM OF ELEC: 290 MOSM/KG (ref 275–295)
P AXIS: 41 DEGREES
P-R INTERVAL: 200 MS
PLATELET # BLD AUTO: 180 10(3)UL (ref 150–450)
POTASSIUM SERPL-SCNC: 3.4 MMOL/L (ref 3.5–5.1)
PROT SERPL-MCNC: 7.2 G/DL (ref 5.7–8.2)
Q-T INTERVAL: 368 MS
QRS DURATION: 96 MS
QTC CALCULATION (BEZET): 421 MS
R AXIS: 18 DEGREES
RBC # BLD AUTO: 4.01 X10(6)UL (ref 3.8–5.3)
SODIUM SERPL-SCNC: 141 MMOL/L (ref 136–145)
T AXIS: 56 DEGREES
VENTRICULAR RATE: 79 BPM
WBC # BLD AUTO: 5.4 X10(3) UL (ref 4–11)

## 2025-05-02 PROCEDURE — 99213 OFFICE O/P EST LOW 20 MIN: CPT | Performed by: FAMILY MEDICINE

## 2025-05-02 PROCEDURE — 93010 ELECTROCARDIOGRAM REPORT: CPT | Performed by: INTERNAL MEDICINE

## 2025-05-02 PROCEDURE — 3008F BODY MASS INDEX DOCD: CPT | Performed by: FAMILY MEDICINE

## 2025-05-02 PROCEDURE — 80053 COMPREHEN METABOLIC PANEL: CPT

## 2025-05-02 PROCEDURE — 3078F DIAST BP <80 MM HG: CPT | Performed by: FAMILY MEDICINE

## 2025-05-02 PROCEDURE — 36415 COLL VENOUS BLD VENIPUNCTURE: CPT

## 2025-05-02 PROCEDURE — 82728 ASSAY OF FERRITIN: CPT

## 2025-05-02 PROCEDURE — 85025 COMPLETE CBC W/AUTO DIFF WBC: CPT

## 2025-05-02 PROCEDURE — 3074F SYST BP LT 130 MM HG: CPT | Performed by: FAMILY MEDICINE

## 2025-05-02 PROCEDURE — 93005 ELECTROCARDIOGRAM TRACING: CPT

## 2025-05-02 NOTE — PROGRESS NOTES
Clau Santamaria is a 40 year old female.   Chief Complaint   Patient presents with    Pre-Op     Left breasts rom  localized excisional bopsy DOS 25  With Dr Rangel      HPI:   Clau presents for preoperative evaluation and clearance for Left breast rom  localized excisional biopsy  to be done on 25.  Clearance for surgery requested by Dr. Rangel.     There is a family hx of breast cancer - grandmother got diagnosed  in her late 30s and mother with breast cancer at age late 40s. They were brca negative as far as patient knows.     Patient is a nonsmoker.   Denies any cardiac symptoms.   No h/o of ischemic heart disease/CHF  No hx of CVD, diabetes, blood thinner use  No personal or fmhx of hypercoagulability.   No hx of adverse reaction to anesthesia.   She has hx of anemia.   No hx of DVT/PE  Patient instructed to avoid/hold NSAIDs, ASA, vitamins & supplements 7 days prior to surgery.  Holding zepbound for one week.        Wt Readings from Last 6 Encounters:   25 192 lb (87.1 kg)   25 194 lb 14.4 oz (88.4 kg)   25 187 lb (84.8 kg)   25 194 lb (88 kg)   25 194 lb (88 kg)   24 197 lb 3.2 oz (89.4 kg)       Current Medications[1]   Allergies[2]   Past Medical History[3]   Past Surgical History[4]   Family History[5]   Social Hx on file[6]   OB History    Para Term  AB Living   2 2 0 0 0 0   SAB IAB Ectopic Multiple Live Births   0 0 0 0 0        REVIEW OF SYSTEMS:   GENERAL HEALTH: feels well otherwise, denies fever  EYES: no visual complaints or deficits  HEENT: denies nasal congestion, sinus pain or sore throat  RESPIRATORY: denies shortness of breath, wheezing or cough  CARDIOVASCULAR: denies chest pain or GARCIA; no palpitations  GI: denies nausea, vomiting, constipation, diarrhea; no rectal bleeding; no heartburn  GENITAL/: no dysuria, urgency or frequency  MUSCULOSKELETAL: no joint complaints upper or lower extremities  NEURO: no sensory  or motor complaint   Immunization History   Administered Date(s) Administered    Covid-19 Vaccine Pfizer 30 mcg/0.3 ml 03/19/2021, 04/09/2021, 12/08/2021    FLU VAC QIV SPLIT 3 YRS AND OLDER (39388) 09/29/2017    FLULAVAL 6 months & older 0.5 ml Prefilled syringe (74574) 11/21/2022    TDAP 04/21/2013, 11/21/2022   Deferred Date(s) Deferred    Influenza Vaccine Refused 02/05/2025    Influenza Vaccine, trivalent (IIV3), PF 0.5mL (72522) 02/05/2025       EXAM:   /68   Pulse 92   Temp 97.2 °F (36.2 °C) (Temporal)   Resp 16   Ht 5' 7\" (1.702 m)   Wt 192 lb (87.1 kg)   LMP 04/15/2025 (Exact Date)   SpO2 99%   BMI 30.07 kg/m²   GENERAL: well developed, well nourished, in no apparent distress  HEENT: normocephalic; normal nose, pharynx and TM's  EYES: PERRLA  NECK: supple, FROM, no thyromegaly  RESPIRATORY: clear to percussion and auscultation  CARDIOVASCULAR: S1, S2 normal, RRR; no S3, no S4; no click; murmur negative  ABDOMEN: normal active BS+, soft, nondistended; no HSM; no masses; no bruits; no masses; nontender  MUSCULOSKELETAL: no acute synovitis upper or lower extremity  EXTREMITIES: no cyanosis, clubbing or edema  NEUROLOGIC: grossly normal   PSYCHIATRIC: alert and oriented x 3; affect appropriate    ASSESSMENT & PLAN:   Preoperative Physical Exam:    Clau was examined today and is an acceptable risk to proceed with surgery.   Labs and EKG are ordered.   Risks and benefits of surgery explained to patient.  Including;  Deep venous thrombosis, pulmonary embolus, bleeding, myocardial infarction, failure to relieve pain, wound infection, wound dehiscence, anesthesia complications, arrhythmia's etc.    Addendum 5/4/25: Incomplete right bundle branch block otherwise normal EKG.  Labs reviewed-hemoglobin 11.6-repeat CBC in 1 month and start taking ferrous sulfate 325 mg daily for 1 month.  K at 3.4.  Recommended electrolyte rich drink or food such as bananas.  Patient is acceptable risk to proceed with  surgery.    Corwin Lr DO        This note was prepared using Dragon Medical voice recognition dictation software. As a result errors may occur. When identified these errors have been corrected. While every attempt is made to correct errors during dictation discrepancies may still exist.      Note to patient: The  Cures Act makes medical notes like these available to patients in the interest of transparency. However, be advised this is a medical document. It is intended as peer to peer communication. It is written in medical language and may contain abbreviations or verbiage that are unfamiliar. It may appear blunt or direct. Medical documents are intended to carry relevant information, facts as evident, and the clinical opinion of the practitioner.          [1]   Current Outpatient Medications   Medication Sig Dispense Refill    Tirzepatide-Weight Management (ZEPBOUND) 12.5 MG/0.5ML Subcutaneous Solution Auto-injector Inject 0.5ML (12.5 mg) into the skin once a week for 4 doses. 2 mL 2    Multiple Vitamins-Minerals (MULTI-VITAMIN/MINERALS) Oral Tab Take 1 tablet by mouth in the morning.      tranexamic acid (LYSTEDA) 650 MG Oral Tab Take 2 tablets (1,300 mg total) by mouth every 8 (eight) hours. For up to 5 days every month with menses 90 tablet 2    Fluticasone Propionate 50 MCG/ACT Nasal Suspension SPRAY 1 to 2 SPRAYS INTO EACH NOSTRIL EVERY DAY     [2]   Allergies  Allergen Reactions    Shellfish Allergy NAUSEA AND VOMITING    Amoxicillin RASH   [3]   Past Medical History:   Allergic rhinitis    Benign thyroid cyst    Endometriosis    Obesity    Thyroiditis   [4]   Past Surgical History:  Procedure Laterality Date          Removal of ovarian cyst(s) Left         Us breast biopsy 1 site left (cpt=19083) Left 2025    Intraductal papilloma (potentiallly pre-CA) to see Dr. Rangel   [5]   Family History  Problem Relation Age of Onset    Breast Cancer Mother         age 40's    Cancer  Mother         Breast cancer    Diabetes Father     Cancer Father         Sarcoma    Cancer Maternal Grandmother         Brest Cancer    Breast Cancer Maternal Grandmother         age 30's    Heart Attack Maternal Grandfather     Heart Attack Paternal Grandmother    [6]   Social History  Socioeconomic History    Marital status:    Tobacco Use    Smoking status: Never     Passive exposure: Never    Smokeless tobacco: Never   Vaping Use    Vaping status: Never Used   Substance and Sexual Activity    Alcohol use: Yes     Alcohol/week: 1.0 standard drink of alcohol     Types: 1 Standard drinks or equivalent per week     Comment: OCCASIONALLY    Drug use: Never   Other Topics Concern    Caffeine Concern Yes     Comment: 1 cup coffee or red bull 3x week    Exercise Yes     Comment: biking 2-3 days a week 5-15miles.

## 2025-05-05 LAB — DEPRECATED HBV CORE AB SER IA-ACNC: 14 NG/ML (ref 50–306)

## 2025-05-08 ENCOUNTER — PATIENT MESSAGE (OUTPATIENT)
Dept: INTERNAL MEDICINE CLINIC | Facility: CLINIC | Age: 40
End: 2025-05-08

## 2025-05-08 NOTE — TELEPHONE ENCOUNTER
Patient is having surgery and needs to hold zepbound.  Also asking what do do with change in formulary July 1st  Last visit 4/1/25 8/27/2025  9:40 AM Jenna Adan APRN [facility] [address]   11/7/2025  9:20 AM Jenna Adan APRN [facility] [address]

## 2025-05-14 ENCOUNTER — APPOINTMENT (OUTPATIENT)
Dept: ADMINISTRATIVE | Facility: HOSPITAL | Age: 40
End: 2025-05-14
Payer: COMMERCIAL

## 2025-05-16 ENCOUNTER — OFFICE VISIT (OUTPATIENT)
Dept: FAMILY MEDICINE CLINIC | Facility: CLINIC | Age: 40
End: 2025-05-16
Payer: COMMERCIAL

## 2025-05-16 VITALS
OXYGEN SATURATION: 97 % | TEMPERATURE: 97 F | RESPIRATION RATE: 16 BRPM | DIASTOLIC BLOOD PRESSURE: 68 MMHG | HEIGHT: 67 IN | WEIGHT: 187 LBS | HEART RATE: 97 BPM | BODY MASS INDEX: 29.35 KG/M2 | SYSTOLIC BLOOD PRESSURE: 110 MMHG

## 2025-05-16 DIAGNOSIS — Z80.3 FAMILY HISTORY OF BREAST CANCER: ICD-10-CM

## 2025-05-16 DIAGNOSIS — E04.1 BENIGN THYROID CYST: ICD-10-CM

## 2025-05-16 DIAGNOSIS — E01.0 THYROMEGALY: ICD-10-CM

## 2025-05-16 DIAGNOSIS — D24.2 INTRADUCTAL PAPILLOMA OF LEFT BREAST: ICD-10-CM

## 2025-05-16 DIAGNOSIS — E06.9 THYROIDITIS: ICD-10-CM

## 2025-05-16 DIAGNOSIS — N92.0 MENORRHAGIA WITH REGULAR CYCLE: ICD-10-CM

## 2025-05-16 DIAGNOSIS — Z00.00 WELL ADULT EXAM: Primary | ICD-10-CM

## 2025-05-16 RX ORDER — FERROUS SULFATE 325(65) MG
325 TABLET, DELAYED RELEASE (ENTERIC COATED) ORAL
COMMUNITY

## 2025-05-16 NOTE — PROGRESS NOTES
Subjective:   Clau Santamaria is a 40 year old female who presents for Well Adult       History/Other:   History of Present Illness  Patient is here for physical.     Clau Santamaria is a 40 year old female with heavy menstrual bleeding and anemia who presents for follow-up and management of her symptoms.    She experiences very heavy menstrual bleeding, describing it as 'really, really heavy.' During her period, she uses period underwear, a large overnight pad, and sometimes a tampon or diva cup, yet still bleeds through if out for more than an hour. Her periods last about seven days, with the first two to three days being the heaviest. Her cycle is regular, occurring every 21 to 25 days. She has not had a pelvic ultrasound in a long time, and her last gynecological evaluation for heavy periods was two years ago.    She is anemic, which she attributes to her heavy menstrual bleeding. She recently started taking an iron supplement, 325 mg, and reports a significant improvement in her energy levels. She takes the iron with orange juice or vitamin C to enhance absorption and has been on this regimen for about ten days.    She has a history of endometriosis and underwent cyst removal surgery, which was followed by spontaneous pregnancies. She has not had a recent pelvic ultrasound since her fertility treatments.    She reports occasional breast pain and dryness of the skin, which she manages with hydrocortisone for eczema. No chest pain, shortness of breath, or palpitations.    Her social history includes working from home while managing two children, which she finds challenging. She is involved in various volunteer activities and is seeking to better manage her time and stress. She occasionally consumes alcohol and is looking to incorporate more physical activity into her routine.      There is a family hx of breast cancer - grandmother got diagnosed  in her late 30s and mother with breast cancer at age late  40s. They were brca negative as far as patient knows.    Patient is expected to have left breast excisional biopsy.  She has positive biopsy findings of intraductal papilloma.  The surgery will be on 5/22/2025    She is on Zepbound for weight loss.  Tolerating medication well     Labs reviewed- hgb 11.6 ferritin 14   Thyroid nodule-there is mild inflammation of the entire gland consistent with thyroid.  Will repeat ultrasound in 2026.    Smoking: none  Alcohol: occasionally   Drugs: none   Sexual hx: 1 partner   STD hx: declined  Occupation: sales   Colonoscopy: due at 43   Pap smear:  Utd - 2022 normal.   Diet: healthy diet  Exercise: intermittently.        Wt Readings from Last 6 Encounters:   05/16/25 187 lb (84.8 kg)   05/02/25 192 lb (87.1 kg)   05/14/25 190 lb (86.2 kg)   04/23/25 194 lb 14.4 oz (88.4 kg)   04/01/25 187 lb (84.8 kg)   02/05/25 194 lb (88 kg)        Chief Complaint Reviewed and Verified  No Further Nursing Notes to   Review  Tobacco Reviewed  Allergies Reviewed  Medications Reviewed    Problem List Reviewed  Medical History Reviewed  Surgical History   Reviewed  Family History Reviewed  Social History Reviewed         Tobacco:  She has never smoked tobacco.    Current Medications[1]           Review of Systems:  Review of Systems   Constitutional: Negative for fever, chills and fatigue. No distress.  HENT: Negative for hearing loss, congestion, sore throat, neck pain   Eyes: Negative for pain and visual disturbance.   Respiratory: Negative for cough, chest tightness, shortness of breath and wheezing.    Cardiovascular: Negative for chest pain, palpitations and leg swelling.   Gastrointestinal: Negative for nausea, vomiting, abdominal pain, diarrhea, blood in stool and abdominal distention.   Genitourinary: Negative for dysuria, hematuria and difficulty urinating.     Objective:   /68   Pulse 97   Temp 97.3 °F (36.3 °C) (Temporal)   Resp 16   Ht 5' 7\" (1.702 m)   Wt 187 lb  (84.8 kg)   LMP 05/10/2025 (Exact Date)   SpO2 97%   BMI 29.29 kg/m²  Estimated body mass index is 29.29 kg/m² as calculated from the following:    Height as of this encounter: 5' 7\" (1.702 m).    Weight as of this encounter: 187 lb (84.8 kg).  Results  LABS  Lipid panel: Normal (12/2024)  A1c: Normal (12/2024)  TSH: Normal (12/2024)     Physical Exam  GENERAL: Alert, cooperative, well developed, no acute distress.  HEENT: Normocephalic, normal oropharynx, moist mucous membranes. Patient has dry flaky external ears.   CHEST: Clear to auscultation bilaterally, no wheezes, rhonchi, or crackles.  CARDIOVASCULAR: Normal heart rate and rhythm, S1 and S2 normal without murmurs.  ABDOMEN: Soft, non-tender, non-distended, without organomegaly, normal bowel sounds.  EXTREMITIES: No cyanosis or edema.  NEUROLOGICAL: Cranial nerves grossly intact, moves all extremities without gross motor or sensory deficit.      Assessment & Plan:   1. Well adult exam (Primary)  2. Thyromegaly- US due again in 2026.   3. Benign thyroid cyst  4. Intraductal papilloma of left breast - excisional biopsy next week  5. Family history of breast cancer  6. Thyroiditis - normal thyroid labs, will monitor yearly.   7. Menorrhagia with regular cycle  -     US PELVIS (TRANSABDOMINAL AND TRANSVAGINAL) (CPT=76856/43625); Future; Expected date: 05/16/2025    Assessment & Plan  Heavy menstrual bleeding with anemia  Chronic heavy menstrual bleeding causing significant anemia. Anemia improved with iron supplementation. Differential includes fibroids, polyps, or thickened endometrial lining.  - Continue iron supplementation with vitamin C.  - Order pelvic ultrasound to evaluate for fibroids, polyps, or other causes.  - Follow up with gynecology for further evaluation.    Eczema  Persistent dryness and eczema despite emollients.  - Use hydrocortisone cream.        No follow-ups on file.        Corwin Lr DO, 5/15/2025, 10:29 PM           The following  individual(s) verbally consented to be recorded using ambient AI listening technology and understand that they can each withdraw their consent to this listening technology at any point by asking the clinician to turn off or pause the recording:    Patient name: Clau Barkergeoff Lr DO        This note was prepared using Dragon Medical voice recognition dictation software. As a result errors may occur. When identified these errors have been corrected. While every attempt is made to correct errors during dictation discrepancies may still exist.      Note to patient: The 21st Century Cures Act makes medical notes like these available to patients in the interest of transparency. However, be advised this is a medical document. It is intended as peer to peer communication. It is written in medical language and may contain abbreviations or verbiage that are unfamiliar. It may appear blunt or direct. Medical documents are intended to carry relevant information, facts as evident, and the clinical opinion of the practitioner.               [1]   Current Outpatient Medications   Medication Sig Dispense Refill    ferrous sulfate 325 (65 FE) MG Oral Tab EC Take 1 tablet (325 mg total) by mouth daily with breakfast.      Tirzepatide-Weight Management (ZEPBOUND) 12.5 MG/0.5ML Subcutaneous Solution Auto-injector Inject 0.5ML (12.5 mg) into the skin once a week for 4 doses. 2 mL 2    Multiple Vitamins-Minerals (MULTI-VITAMIN/MINERALS) Oral Tab Take 1 tablet by mouth in the morning.      tranexamic acid (LYSTEDA) 650 MG Oral Tab Take 2 tablets (1,300 mg total) by mouth every 8 (eight) hours. For up to 5 days every month with menses 90 tablet 2    Fluticasone Propionate 50 MCG/ACT Nasal Suspension SPRAY 1 to 2 SPRAYS INTO EACH NOSTRIL EVERY DAY

## 2025-05-19 ENCOUNTER — HOSPITAL ENCOUNTER (OUTPATIENT)
Dept: MAMMOGRAPHY | Facility: HOSPITAL | Age: 40
Discharge: HOME | End: 2025-05-19
Attending: SURGERY
Payer: COMMERCIAL

## 2025-05-19 DIAGNOSIS — D24.2 INTRADUCTAL PAPILLOMA OF LEFT BREAST: ICD-10-CM

## 2025-05-19 PROCEDURE — 19285 PERQ DEV BREAST 1ST US IMAG: CPT | Performed by: SURGERY

## 2025-05-19 PROCEDURE — 77065 DX MAMMO INCL CAD UNI: CPT | Performed by: SURGERY

## 2025-05-19 NOTE — IMAGING NOTE
Assisted Dr. Garcia with Vinita  localization of left breast for excisional biopsy scheduled for 5-22-25.  Procedure explained and all questions answered. Pt verbalized understanding. Emotional support provided and pt tolerated procedure well with minimal discomfort. No bleeding or oozing from site. Band Aid applied to site and patient discharged to home in stable condition after post procedure mammogram images were completed.

## 2025-05-22 ENCOUNTER — HOSPITAL ENCOUNTER (OUTPATIENT)
Facility: HOSPITAL | Age: 40
Setting detail: HOSPITAL OUTPATIENT SURGERY
Discharge: HOME | End: 2025-05-22
Attending: SURGERY | Admitting: SURGERY
Payer: COMMERCIAL

## 2025-05-22 ENCOUNTER — ANESTHESIA EVENT (OUTPATIENT)
Dept: SURGERY | Facility: HOSPITAL | Age: 40
End: 2025-05-22
Payer: COMMERCIAL

## 2025-05-22 ENCOUNTER — HOSPITAL ENCOUNTER (OUTPATIENT)
Dept: MAMMOGRAPHY | Facility: HOSPITAL | Age: 40
Discharge: HOME | End: 2025-05-22
Attending: SURGERY | Admitting: SURGERY
Payer: COMMERCIAL

## 2025-05-22 ENCOUNTER — ANESTHESIA (OUTPATIENT)
Dept: SURGERY | Facility: HOSPITAL | Age: 40
End: 2025-05-22
Payer: COMMERCIAL

## 2025-05-22 VITALS
TEMPERATURE: 97 F | HEART RATE: 79 BPM | RESPIRATION RATE: 16 BRPM | OXYGEN SATURATION: 100 % | DIASTOLIC BLOOD PRESSURE: 63 MMHG | HEIGHT: 67 IN | BODY MASS INDEX: 30.29 KG/M2 | SYSTOLIC BLOOD PRESSURE: 111 MMHG | WEIGHT: 193 LBS

## 2025-05-22 DIAGNOSIS — D24.2 INTRADUCTAL PAPILLOMA OF LEFT BREAST: ICD-10-CM

## 2025-05-22 LAB — B-HCG UR QL: NEGATIVE

## 2025-05-22 PROCEDURE — 76098 X-RAY EXAM SURGICAL SPECIMEN: CPT | Performed by: SURGERY

## 2025-05-22 PROCEDURE — 88341 IMHCHEM/IMCYTCHM EA ADD ANTB: CPT | Performed by: SURGERY

## 2025-05-22 PROCEDURE — 81025 URINE PREGNANCY TEST: CPT

## 2025-05-22 PROCEDURE — 88342 IMHCHEM/IMCYTCHM 1ST ANTB: CPT | Performed by: SURGERY

## 2025-05-22 RX ORDER — NALOXONE HYDROCHLORIDE 0.4 MG/ML
0.08 INJECTION, SOLUTION INTRAMUSCULAR; INTRAVENOUS; SUBCUTANEOUS AS NEEDED
Status: DISCONTINUED | OUTPATIENT
Start: 2025-05-22 | End: 2025-05-22

## 2025-05-22 RX ORDER — ONDANSETRON 2 MG/ML
INJECTION INTRAMUSCULAR; INTRAVENOUS AS NEEDED
Status: DISCONTINUED | OUTPATIENT
Start: 2025-05-22 | End: 2025-05-22 | Stop reason: SURG

## 2025-05-22 RX ORDER — ACETAMINOPHEN 500 MG
1000 TABLET ORAL EVERY 6 HOURS PRN
Qty: 50 TABLET | Refills: 0 | Status: SHIPPED | COMMUNITY
Start: 2025-05-22

## 2025-05-22 RX ORDER — SCOPOLAMINE 1 MG/3D
1 PATCH, EXTENDED RELEASE TRANSDERMAL ONCE
Status: DISCONTINUED | OUTPATIENT
Start: 2025-05-22 | End: 2025-05-22 | Stop reason: HOSPADM

## 2025-05-22 RX ORDER — IBUPROFEN 600 MG/1
600 TABLET, FILM COATED ORAL EVERY 6 HOURS PRN
Qty: 50 TABLET | Refills: 0 | Status: SHIPPED | COMMUNITY
Start: 2025-05-22

## 2025-05-22 RX ORDER — LIDOCAINE HYDROCHLORIDE 10 MG/ML
INJECTION, SOLUTION EPIDURAL; INFILTRATION; INTRACAUDAL; PERINEURAL AS NEEDED
Status: DISCONTINUED | OUTPATIENT
Start: 2025-05-22 | End: 2025-05-22 | Stop reason: SURG

## 2025-05-22 RX ORDER — HYDROCODONE BITARTRATE AND ACETAMINOPHEN 5; 325 MG/1; MG/1
1 TABLET ORAL ONCE AS NEEDED
Status: DISCONTINUED | OUTPATIENT
Start: 2025-05-22 | End: 2025-05-22

## 2025-05-22 RX ORDER — HEPARIN SODIUM 5000 [USP'U]/ML
5000 INJECTION, SOLUTION INTRAVENOUS; SUBCUTANEOUS ONCE
Status: COMPLETED | OUTPATIENT
Start: 2025-05-22 | End: 2025-05-22

## 2025-05-22 RX ORDER — HYDROCODONE BITARTRATE AND ACETAMINOPHEN 5; 325 MG/1; MG/1
2 TABLET ORAL ONCE AS NEEDED
Status: DISCONTINUED | OUTPATIENT
Start: 2025-05-22 | End: 2025-05-22

## 2025-05-22 RX ORDER — ACETAMINOPHEN 500 MG
1000 TABLET ORAL ONCE AS NEEDED
Status: DISCONTINUED | OUTPATIENT
Start: 2025-05-22 | End: 2025-05-22

## 2025-05-22 RX ORDER — ACETAMINOPHEN 500 MG
1000 TABLET ORAL ONCE
Status: DISCONTINUED | OUTPATIENT
Start: 2025-05-22 | End: 2025-05-22 | Stop reason: HOSPADM

## 2025-05-22 RX ORDER — LIDOCAINE HYDROCHLORIDE AND EPINEPHRINE 10; 10 MG/ML; UG/ML
INJECTION, SOLUTION INFILTRATION; PERINEURAL AS NEEDED
Status: DISCONTINUED | OUTPATIENT
Start: 2025-05-22 | End: 2025-05-22 | Stop reason: HOSPADM

## 2025-05-22 RX ORDER — HYDROMORPHONE HYDROCHLORIDE 1 MG/ML
0.2 INJECTION, SOLUTION INTRAMUSCULAR; INTRAVENOUS; SUBCUTANEOUS EVERY 5 MIN PRN
Status: DISCONTINUED | OUTPATIENT
Start: 2025-05-22 | End: 2025-05-22

## 2025-05-22 RX ORDER — SODIUM CHLORIDE, SODIUM LACTATE, POTASSIUM CHLORIDE, CALCIUM CHLORIDE 600; 310; 30; 20 MG/100ML; MG/100ML; MG/100ML; MG/100ML
INJECTION, SOLUTION INTRAVENOUS CONTINUOUS
Status: DISCONTINUED | OUTPATIENT
Start: 2025-05-22 | End: 2025-05-22

## 2025-05-22 RX ORDER — HYDROMORPHONE HYDROCHLORIDE 1 MG/ML
0.4 INJECTION, SOLUTION INTRAMUSCULAR; INTRAVENOUS; SUBCUTANEOUS EVERY 5 MIN PRN
Status: DISCONTINUED | OUTPATIENT
Start: 2025-05-22 | End: 2025-05-22

## 2025-05-22 RX ORDER — DEXAMETHASONE SODIUM PHOSPHATE 4 MG/ML
VIAL (ML) INJECTION AS NEEDED
Status: DISCONTINUED | OUTPATIENT
Start: 2025-05-22 | End: 2025-05-22 | Stop reason: SURG

## 2025-05-22 RX ORDER — ONDANSETRON 2 MG/ML
4 INJECTION INTRAMUSCULAR; INTRAVENOUS EVERY 6 HOURS PRN
Status: DISCONTINUED | OUTPATIENT
Start: 2025-05-22 | End: 2025-05-22

## 2025-05-22 RX ORDER — PROCHLORPERAZINE EDISYLATE 5 MG/ML
5 INJECTION INTRAMUSCULAR; INTRAVENOUS EVERY 8 HOURS PRN
Status: DISCONTINUED | OUTPATIENT
Start: 2025-05-22 | End: 2025-05-22

## 2025-05-22 RX ORDER — BUPIVACAINE HYDROCHLORIDE 5 MG/ML
INJECTION, SOLUTION EPIDURAL; INTRACAUDAL; PERINEURAL AS NEEDED
Status: DISCONTINUED | OUTPATIENT
Start: 2025-05-22 | End: 2025-05-22 | Stop reason: HOSPADM

## 2025-05-22 RX ORDER — DIPHENHYDRAMINE HYDROCHLORIDE 50 MG/ML
12.5 INJECTION, SOLUTION INTRAMUSCULAR; INTRAVENOUS AS NEEDED
Status: DISCONTINUED | OUTPATIENT
Start: 2025-05-22 | End: 2025-05-22

## 2025-05-22 RX ADMIN — ONDANSETRON 4 MG: 2 INJECTION INTRAMUSCULAR; INTRAVENOUS at 10:17:00

## 2025-05-22 RX ADMIN — DEXAMETHASONE SODIUM PHOSPHATE 8 MG: 4 MG/ML VIAL (ML) INJECTION at 10:17:00

## 2025-05-22 RX ADMIN — SODIUM CHLORIDE, SODIUM LACTATE, POTASSIUM CHLORIDE, CALCIUM CHLORIDE: 600; 310; 30; 20 INJECTION, SOLUTION INTRAVENOUS at 10:10:00

## 2025-05-22 RX ADMIN — LIDOCAINE HYDROCHLORIDE 50 MG: 10 INJECTION, SOLUTION EPIDURAL; INFILTRATION; INTRACAUDAL; PERINEURAL at 10:13:00

## 2025-05-22 NOTE — OPERATIVE REPORT
[facility]   part of [facility]    Operative Note         Clau Santamaria Location: OR   CSN [id number] MRN [id number]   Admission Date 5/22/2025 Operation Date 5/22/2025   Attending Physician Renetta Rangel MD       Patient Name: Clau Santamaria     Preoperative Diagnosis: Intraductal papilloma of left breast [D24.2]     Postoperative Diagnosis: Intraductal papilloma of left breast [D24.2]     Procedure(s):  Left breast rom  localized excisional biopsy     Primary Surgeon: Renetta Rangel MD     Surgical Assistant.: Destiny Jackson     Anesthesia: MAC     Specimen:   ID Type Source Tests Collected by Time Destination   1 : LEFT BREAST TISSUE STITCH MARKS SHORT SUPERIOR, LONG LATERAL- INK MARKS ANTERIOR MARGIN Breast tissue Breast, left SURGICAL PATHOLOGY TISSUE Renetta Rangel MD 5/22/2025 10:27 AM         Estimated Blood Loss: Blood Output: 2 mL (5/22/2025 10:40 AM)       Complications: none      Indications for procedure: Ms. Clau Santamaria is a 40 year old woman who presents for evaluation of left breast papilloma.      She underwent diagnostic mammogram on 3/12/2025 due to breast mass.  She has density C breast tissue.  In the left breast, 3 o'clock position, 9 cm from the nipple, there was a lesion measuring 1.1 cm.  There was a simple cyst in the right axilla at the site of the patient's palpable concern, measuring up to 4 mm.  Biopsy was recommended for the left breast lesion and this was performed on 3/19/2025, butterfly clip was placed.  Pathology showed multiple fragments of intraductal papilloma.  This was concordant with the imaging assessment.        Surgical Findings: clip and rom seen on intra-op xray       Operative Summary:  The patient was brought to the operating room and sedation was initiated by anesthesia team. Rom  was tested and showed an appropriate signal. The patient was prepped and draped in the usual fashion. Time out was performed, left side was confirmed  as the correct side. Local anesthetic was infiltrated at the proposed incision site and a curvilinear incision was made, incorporating some of the IMF. Skin flaps were raised toward the lesion, confirmed by Vinita . It was located 3:00, 8 cm from the nipple. The mass was then removed from the remaining margins of the breast and marked with suture (Double long lateral, double short superior, ink anterior). Specimen xray was completed and showed clip and vinita present. Additional local anesthetic was infiltrated. Hemostasis was obtained.  The deep tissues were closed using 0 vicryl suture. The deep dermis was closed with 3-0 vicryl and the skin was closed with 4-0 monocryl. The incision was dressed with dermabond, gauze, fluffs, and a bra. The patient was taken to recovery in stable condition. Assistance from a surgical assistant was necessary for optimal visualization during the case.        Implants: * No implants in log *     Drains: none     Condition: stable   DC home with over the counter pain medicine       Renetta Rangel MD

## 2025-05-22 NOTE — DISCHARGE INSTRUCTIONS
Breast Surgery  Post-operative Instructions    Renetta Rangel MD  General Instructions  The following instructions will provide helpful information that will assist your recovery. These are designed to be general guidelines. Please remember that everyone heals and recovers differently. Listen to your body and rest when you are tired. If you have any questions or concerns, please do not hesitate to contact my office. I would like to see you in the office about one week after surgery - usually you already have an appointment made before the surgery takes place. If not, please schedule and appointment through my office: [phone].    Restrictions  No lifting anything greater than a gallon of milk (>10 lbs) for 1 week after surgery. After that, you may gradually increase the amount of weight based on your comfort level. You should avoid a lot of repetitious activity with the arm until the wound is well-healed (about two weeks).   You should not drive a car until you believe you can react to an emergency situation  You may shower the day after surgery. You should not bathe or swim (i.e. submerge wound) until the wound is well healed (about 2-4 weeks).  There are no dietary restrictions.    Wound Care  You may shower on the day after surgery. There is a clear glue-like dressing over your incision. You should leave this in place and it will start to peel off about 10 days after your surgery. The stitches are all underneath the skin and will dissolve on their own.  Let soapy water run over the incision, don't scrub the skin.   You can keep a gauze dressing on the wound until the wound is completely dry and without drainage-usually 1-3 days. You can always use gauze for comfort if the incisions are rubbing on your clothing.  If a surgical bra was placed after the surgery, I encourage you to wear it as much as possible during the week following the procedure (including during sleep). You can also use gauze against the  area of your surgery to help with extra compression. Compression will help avoid fluid retention and fluid collection. Alternatively, you may choose to wear your own bra provided it is comfortable, provides support and does not have an underwire. If the breast doesn't move it is less painful.   It is normal to feel a lump in the area of the incisions for up to 6 months. This is part of the healing process. Eventually the breast will return to its normal condition.   Pain Medication  We recommend you mainly use alternating Tylenol/acetaminophen or Motrin/ibuprofen/Advil for pain control. We recommend 1000 mg Tylenol every 6 hours (do not exceed 4000 mg Tylenol in any 24 hour period) and ibuprofen 600 mg every 6 hours. Usually, this is just prescribed over the counter and you may use what you have at home.  Using an ice pack for 10-20 minutes at a time over the incision can also alleviate pain.    Pathology Report  The Pathology report is usually available 5-7 business days following the surgery. Dr. Rangel will call you or send you a [employer] message with the results once the report is available.    Notify my office if:   Your temperature is over 101.5 F   You notice increasing swelling, redness, warmth or drainage from around the incision or drain site  Sudden swelling, redness, or bruising at the operative site 1-2 days after surgery; this may indicate bleeding that requires intervention    If you experience any problems, please call my office and either my nurse or myself will respond. After hours, you will be forwarded to my answering service which will help you get in touch with myself or the physician covering for me.  Office: [phone]

## 2025-05-22 NOTE — ANESTHESIA PREPROCEDURE EVALUATION
PRE-OP EVALUATION    Patient Name: Clau Santamaria    Admit Diagnosis: Intraductal papilloma of left breast [D24.2]    Pre-op Diagnosis: Intraductal papilloma of left breast [D24.2]    Left breast rom  localized excisional biopsy    Anesthesia Procedure: Left breast rom  localized excisional biopsy (Left)    Surgeons and Role:     * Renetta Rangel MD - Primary    Pre-op vitals reviewed.  Temp: 98.5 °F (36.9 °C)  Pulse: 84  Resp: 16  BP: 118/86  SpO2: 100 %  Body mass index is 30.23 kg/m².    Current medications reviewed.  Hospital Medications:  Current Medications[1]    Outpatient Medications:   Prescriptions Prior to Admission[2]    Allergies: Shellfish allergy and Amoxicillin      Anesthesia Evaluation    Patient summary reviewed.    Anesthetic Complications  (-) history of anesthetic complications         GI/Hepatic/Renal    Negative GI/hepatic/renal ROS.                             Cardiovascular                     (+) hyperlipidemia                                  Endo/Other    Negative endo/other ROS.                              Pulmonary    Negative pulmonary ROS.                       Neuro/Psych    Negative neuro/psych ROS.                              Past Surgical History[3]  Social Hx on file[4]  History   Drug Use Unknown     Available pre-op labs reviewed.  Lab Results   Component Value Date    WBC 5.4 05/02/2025    RBC 4.01 05/02/2025    HGB 11.6 (L) 05/02/2025    HCT 35.3 05/02/2025    MCV 88.0 05/02/2025    MCH 28.9 05/02/2025    MCHC 32.9 05/02/2025    RDW 13.3 05/02/2025    .0 05/02/2025     Lab Results   Component Value Date     05/02/2025    K 3.4 (L) 05/02/2025     05/02/2025    CO2 26.0 05/02/2025    BUN 11 05/02/2025    CREATSERUM 0.71 05/02/2025    GLU 70 05/02/2025    CA 9.6 05/02/2025            Airway      Mallampati: II  Mouth opening: 3 FB  TM distance: 4 - 6 cm  Neck ROM: full Cardiovascular    Cardiovascular exam normal.         Dental  Comment:  Normal wear/minor imperfections and discoloration noted. No grossly weakened or damaged teeth on exam.           Pulmonary    Pulmonary exam normal.                 Other findings        ASA: 2   Plan: MAC  NPO status verified and patient meets guidelines.        Comment: We have decided on MAC for this procedure. MAC stands for Monitored Anesthesia Care and is a type of sedation. The patient will be given medications through the IV to relax the patient and help with pain control. During MAC the patient will be breathing on their own during the case. The patient has a risk of awareness before, during and after the procedure. There is also a risk of requiring general anesthesia and placement of a breathing tube should the patient not breathe well or should MAC not provide adequate sedation for the patient to tolerate the procedure. All questions were answered.    Your anesthesia will be given by an anesthesia team that includes myself and a CRNA. I will be there for the essential parts of the anesthetic. The CRNA will be there for the entire procedure. We have discussed the plan for your anesthetic and will collaborate to care for you.                Present on Admission:  **None**               [1]  • acetaminophen (Tylenol Extra Strength) tab 1,000 mg  1,000 mg Oral Once   • scopolamine (Transderm-Scop) 1 MG/3DAYS patch 1 patch  1 patch Transdermal Once   • lactated ringers infusion   Intravenous Continuous   • [COMPLETED] heparin (Porcine) 5000 UNIT/ML injection 5,000 Units  5,000 Units Subcutaneous Once   • ceFAZolin (Ancef) 2g in 10mL IV syringe premix  2 g Intravenous Once   [2]  Medications Prior to Admission   Medication Sig Dispense Refill Last Dose/Taking   • Tirzepatide-Weight Management (ZEPBOUND) 12.5 MG/0.5ML Subcutaneous Solution Auto-injector Inject 0.5ML (12.5 mg) into the skin once a week for 4 doses. 2 mL 2 5/13/2025   • Multiple Vitamins-Minerals (MULTI-VITAMIN/MINERALS) Oral Tab Take 1 tablet by  mouth in the morning.   Taking   • tranexamic acid (LYSTEDA) 650 MG Oral Tab Take 2 tablets (1,300 mg total) by mouth every 8 (eight) hours. For up to 5 days every month with menses 90 tablet 2 Past Month   • ferrous sulfate 325 (65 FE) MG Oral Tab EC Take 1 tablet (325 mg total) by mouth daily with breakfast.      • Fluticasone Propionate 50 MCG/ACT Nasal Suspension SPRAY 1 to 2 SPRAYS INTO EACH NOSTRIL EVERY DAY   More than a month   [3]  Past Surgical History:  Procedure Laterality Date   •      • Removal of ovarian cyst(s) Left        •  breast biopsy 1 site left (cpt=19083) Left 2025    Intraductal papilloma (potentiallly pre-CA) to see Dr. Rangel   [4]  Social History  Socioeconomic History   • Marital status:    Tobacco Use   • Smoking status: Never     Passive exposure: Never   • Smokeless tobacco: Never   Vaping Use   • Vaping status: Never Used   Substance and Sexual Activity   • Alcohol use: Yes     Alcohol/week: 1.0 standard drink of alcohol     Types: 1 Standard drinks or equivalent per week     Comment: OCCASIONALLY   • Drug use: Never   Other Topics Concern   • Caffeine Concern Yes     Comment: 1 cup coffee or red bull 3x week   • Exercise Yes     Comment: biking 2-3 days a week 5-15miles.

## 2025-05-22 NOTE — INTERVAL H&P NOTE
The risks, benefits, and alternatives were discussed including, but not limited to, seroma development, infection, and bleeding. We also discussed the possibility for upstaging of pathology which would require further surgery on another day. We discussed this is an outpatient procedure, lifting restrictions, pain management, and return to work.    Plan: left breast rom localized excisional biopsy    Renetta Rangel MD  Breast Surgical Oncology

## 2025-05-22 NOTE — ANESTHESIA POSTPROCEDURE EVALUATION
[facility]    Clau Santamaria Patient Status:  Hospital Outpatient Surgery   Age/Gender 40 year old female MRN [id number]   Location [facility] SURGERY Attending Renetta Rangel MD   Hosp Day # 0 PCP Corwin Lr DO       Anesthesia Post-op Note    Left breast rom  localized excisional biopsy    Procedure Summary       Date: 05/22/25 Room / Location:  MAIN OR 03 / [facility] MAIN OR    Anesthesia Start: 1009 Anesthesia Stop: 1056    Procedure: Left breast rom  localized excisional biopsy (Left: Breast) Diagnosis:       Intraductal papilloma of left breast      (Intraductal papilloma of left breast [D24.2])    Surgeons: Renetta Rangel MD Anesthesiologist: Spencer Barroso MD    Anesthesia Type: MAC ASA Status: 2            Anesthesia Type: MAC    Vitals Value Taken Time   /59 05/22/25 10:58   Temp 97.5 05/22/25 10:58   Pulse 82 05/22/25 10:58   Resp 18 05/22/25 10:58   SpO2 100% 05/22/25 10:58           Patient Location: Same Day Surgery    Anesthesia Type: MAC    Airway Patency: patent    Postop Pain Control: adequate    Mental Status: mildly sedated but able to meaningfully participate in the post-anesthesia evaluation    Nausea/Vomiting: none    Cardiopulmonary/Hydration status: stable euvolemic    Complications: no apparent anesthesia related complications    Postop vital signs: stable    Dental Exam: Unchanged from Preop    Patient to be discharged home when criteria met.

## 2025-05-23 NOTE — PROGRESS NOTES
Breast Surgery Postop Follow up     History of Present Illness:   Clau Santamaria is a 40 year old patient who presented with left breast papilloma now s/p left breast rom localized excisional biopsy on 5/22/25. 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       The incision is healing well, she has been keeping it clean and dry. She is still wearing her surgical bra. Her pain has been under control and she did not require opioid medication. She denies shortness of breath, nausea/vomiting, constipation/diarrhea.      Past medical history, surgical history, family history, allergies, and social history were updated as applicable in EPIC, otherwise see prior consultation note.    Review of Systems   Constitutional:  Negative for chills and fever.   HENT:  Negative for sore throat and trouble swallowing.    Eyes:  Negative for visual disturbance.   Respiratory:  Negative for cough, chest tightness and shortness of breath.    Cardiovascular:  Negative for chest pain, palpitations and leg swelling.   Gastrointestinal:  Negative for nausea, vomiting, abdominal pain, diarrhea, constipation and blood in stool.   Genitourinary:  Negative for dysuria, hematuria and difficulty urinating.   Skin:  Negative for rash.   Neurological:  Negative for numbness and headaches.        Physical Exam  Vitals reviewed.   Constitutional:       Appearance: Normal appearance.   HENT:      Head: Normocephalic and atraumatic.   Eyes:      Extraocular Movements: Extraocular movements intact.      Pupils: Pupils are equal, round, and reactive to light.   Cardiovascular:      Rate and Rhythm: Normal rate.   Pulmonary:      Effort: Pulmonary effort is normal.   Chest:   Breasts:     Right: Normal. No mass, nipple discharge, skin change or tenderness.      Left: Normal. No mass, nipple discharge, skin change or tenderness.          Comments: Well healing incision   No seroma  Abdominal:      General: Abdomen is flat.      Palpations:  Abdomen is soft.   Musculoskeletal:         General: Normal range of motion.      Cervical back: Normal range of motion and neck supple.   Lymphadenopathy:      Upper Body:      Right upper body: No supraclavicular or axillary adenopathy.      Left upper body: No supraclavicular or axillary adenopathy.   Skin:     General: Skin is warm and dry.   Neurological:      General: No focal deficit present.      Mental Status: She is alert and oriented to person, place, and time.   Psychiatric:         Mood and Affect: Mood normal.          Labs/imaging: reviewed in [facility]     Impression:   Clau Santamaria is a 40 year old patient who presented with left breast papilloma now s/p left breast rom localized excisional biopsy on 5/22/25. 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      Discussion and Plan:     Patient's pathology report was discussed with her. She is healing well.     Surgical plan: complete     Further imaging: Next mammogram will be due March 2026  MRI screening ordered for September 2025    Return to clinic: 6 months    Patient is high risk for breast cancer. She will undergo MRI screening in addition to mammogram. She is planning on meeting with genetic counseling due to family history.      Renetta Rangel MD  Breast Surgical Oncology    Copy: Dr. Lr

## 2025-05-28 ENCOUNTER — OFFICE VISIT (OUTPATIENT)
Facility: CLINIC | Age: 40
End: 2025-05-28
Payer: COMMERCIAL

## 2025-05-28 DIAGNOSIS — Z91.89 AT HIGH RISK FOR BREAST CANCER: ICD-10-CM

## 2025-05-28 DIAGNOSIS — N60.99 ATYPICAL LOBULAR HYPERPLASIA (ALH) OF BREAST: ICD-10-CM

## 2025-05-28 DIAGNOSIS — D24.2 INTRADUCTAL PAPILLOMA OF LEFT BREAST: Primary | ICD-10-CM

## 2025-05-28 PROCEDURE — 99024 POSTOP FOLLOW-UP VISIT: CPT | Performed by: SURGERY

## 2025-05-28 NOTE — PATIENT INSTRUCTIONS
You can return to activity as tolerated about 7-10 days after surgery  You can wear whatever bra is comfortable for you 1 week after surgery  You may have low energy for a few weeks while the inside is still healing, remember to take it easy   Your skin glue will flake off on its own  Don't use scar creams or lotions until about 4 weeks after surgery  Do not submerge incision for 2-3 weeks after surgery  Return in 6 months  If interested in genetic testing, please call [phone] to make an appointment with a genetic counselor at your convenience

## 2025-06-06 ENCOUNTER — HOSPITAL ENCOUNTER (OUTPATIENT)
Dept: ULTRASOUND IMAGING | Age: 40
Discharge: HOME | End: 2025-06-06
Attending: FAMILY MEDICINE
Payer: COMMERCIAL

## 2025-06-06 DIAGNOSIS — N92.0 MENORRHAGIA WITH REGULAR CYCLE: ICD-10-CM

## 2025-06-06 PROCEDURE — 76830 TRANSVAGINAL US NON-OB: CPT | Performed by: FAMILY MEDICINE

## 2025-06-06 PROCEDURE — 76856 US EXAM PELVIC COMPLETE: CPT | Performed by: FAMILY MEDICINE

## 2025-07-24 RX ORDER — TIRZEPATIDE 12.5 MG/.5ML
INJECTION, SOLUTION SUBCUTANEOUS
Qty: 2 ML | Refills: 1 | Status: SHIPPED | OUTPATIENT
Start: 2025-07-24

## 2025-07-24 NOTE — TELEPHONE ENCOUNTER
Requesting   Requested Prescriptions     Pending Prescriptions Disp Refills    ZEPBOUND 12.5 MG/0.5ML Subcutaneous Solution Auto-injector [Pharmacy Med Name: Zepbound Subcutaneous Solution Auto-injector 12.5 MG/0.5ML] 2 mL 0     Sig: INJECT 12.5 MG INTO THE SKIN ONCE A WEEK FOR 4 DOSES.       LOV: 4/01/2025 tele  RTC: 8/27/2025  Last Relevant Labs: na  Filled: 4/21/2025 #2 ml with 2 refills    Future Appointments   Date Time Provider Department Center   8/27/2025  9:40 AM Jenna Adan APRN [facility] [address]   9/15/2025  9:00 AM Corwin Lr DO [facility] [address] [facility] [address]   11/7/2025  9:20 AM Jenna Adan APRN [facility] [address]   11/12/2025 12:45 PM Renetta Rangel MD [id number] [address]

## 2025-08-20 RX ORDER — AMOXICILLIN 500 MG/1
TABLET, FILM COATED ORAL
COMMUNITY
Start: 2025-02-27

## 2025-08-20 RX ORDER — POLYMYXIN B SULFATE AND TRIMETHOPRIM 1; 10000 MG/ML; [USP'U]/ML
SOLUTION OPHTHALMIC
COMMUNITY

## 2025-08-27 ENCOUNTER — OFFICE VISIT (OUTPATIENT)
Dept: INTERNAL MEDICINE CLINIC | Facility: CLINIC | Age: 40
End: 2025-08-27

## 2025-08-27 VITALS
RESPIRATION RATE: 18 BRPM | BODY MASS INDEX: 29.35 KG/M2 | SYSTOLIC BLOOD PRESSURE: 110 MMHG | HEART RATE: 78 BPM | OXYGEN SATURATION: 97 % | WEIGHT: 187 LBS | DIASTOLIC BLOOD PRESSURE: 76 MMHG | HEIGHT: 67 IN

## 2025-08-27 DIAGNOSIS — E78.6 LOW HDL (UNDER 40): ICD-10-CM

## 2025-08-27 DIAGNOSIS — R73.03 PREDIABETES: ICD-10-CM

## 2025-08-27 DIAGNOSIS — E66.9 OBESITY WITH BODY MASS INDEX (BMI) OF 35.0 TO 39.9 WITHOUT COMORBIDITY: ICD-10-CM

## 2025-08-27 DIAGNOSIS — F43.9 STRESS: ICD-10-CM

## 2025-08-27 DIAGNOSIS — Z51.81 THERAPEUTIC DRUG MONITORING: Primary | ICD-10-CM

## 2025-08-27 PROCEDURE — 3078F DIAST BP <80 MM HG: CPT | Performed by: NURSE PRACTITIONER

## 2025-08-27 PROCEDURE — 3008F BODY MASS INDEX DOCD: CPT | Performed by: NURSE PRACTITIONER

## 2025-08-27 PROCEDURE — 3074F SYST BP LT 130 MM HG: CPT | Performed by: NURSE PRACTITIONER

## 2025-08-27 PROCEDURE — 99214 OFFICE O/P EST MOD 30 MIN: CPT | Performed by: NURSE PRACTITIONER

## (undated) DEVICE — ANTIBACTERIAL UNDYED BRAIDED (POLYGLACTIN 910), SYNTHETIC ABSORBABLE SUTURE: Brand: COATED VICRYL

## (undated) DEVICE — 3M™ STERI-DRAPE™ INSTRUMENT POUCH 1018: Brand: STERI-DRAPE™

## (undated) DEVICE — SUT PERMA- 3-0 30IN SH NABSRB BLK 26MM 1/2

## (undated) DEVICE — VIOLET BRAIDED (POLYGLACTIN 910), SYNTHETIC ABSORBABLE SUTURE: Brand: COATED VICRYL

## (undated) DEVICE — SLEEVE COMPR MD KNEE LEN SGL USE KENDALL SCD

## (undated) DEVICE — GLOVE SUR 6.5 SENSICARE PI PIP CRM PWD F

## (undated) DEVICE — COVER,LIGHT,CAMERA,HARD,1/PK,STRL: Brand: MEDLINE

## (undated) DEVICE — SUT COAT VCRL 0 27IN CT-1 ABSRB UD 36MM 1/2

## (undated) DEVICE — SUT MCRYL 4-0 18IN PS-2 ABSRB UD 19MM 3/8 CIR

## (undated) DEVICE — DRAPE PACK CHEST AND U BAR

## (undated) DEVICE — SUT PERMA- 2-0 18IN FS NABSRB BLK 26MM 3/8

## (undated) DEVICE — GAUZE,SPONGE,FLUFF,6"X6.75",STRL,5/TRAY: Brand: MEDLINE

## (undated) DEVICE — GOWN,SIRUS,FABRNF,L,20/CS: Brand: MEDLINE

## (undated) DEVICE — WECK HORIZON MED BLUE CLIP DISP

## (undated) DEVICE — INSULATED BLADE ELECTRODE: Brand: EDGE

## (undated) DEVICE — PAD,ABDOMINAL,8"X7.5",ST,LF,20/BX: Brand: MEDLINE INDUSTRIES, INC.

## (undated) DEVICE — SOLUTION IRRIG 1000ML 0.9% NACL USP BTL

## (undated) DEVICE — SKIN REG/FINE DUAL MARKER, RULER, LABELS: Brand: MEDLINE

## (undated) DEVICE — COVER MAYOSTAND 23X54IN NWVN FAB

## (undated) DEVICE — BREAST-HERNIA-PORT CDS-LF: Brand: MEDLINE INDUSTRIES, INC.

## (undated) DEVICE — EXOFIN PRECISION PEN HIGH VISCOSITY TOPICAL SKIN ADHESIVE: Brand: EXOFIN PRECISION PEN, 1G

## (undated) DEVICE — Device

## (undated) DEVICE — GLOVE SUR 7 SENSICARE PI PIP GRN PWD F

## (undated) DEVICE — UNDERPAD 30X36 300LB W CAP

## (undated) NOTE — Clinical Note
Thank you for allowing me to care for your patient! I will start scheduling her for left breast excisional biopsy for her papilloma. Thanks, Renetta Rangel